# Patient Record
Sex: MALE | Race: BLACK OR AFRICAN AMERICAN | Employment: OTHER | URBAN - METROPOLITAN AREA
[De-identification: names, ages, dates, MRNs, and addresses within clinical notes are randomized per-mention and may not be internally consistent; named-entity substitution may affect disease eponyms.]

---

## 2018-06-25 PROBLEM — F32.A MILD DEPRESSIVE DISORDER: Status: ACTIVE | Noted: 2018-06-25

## 2018-06-25 PROCEDURE — 81003 URINALYSIS AUTO W/O SCOPE: CPT | Performed by: INTERNAL MEDICINE

## 2019-06-13 PROBLEM — F32.A MILD DEPRESSIVE DISORDER: Status: RESOLVED | Noted: 2018-06-25 | Resolved: 2019-06-13

## 2021-06-21 PROBLEM — M06.00 RHEUMATOID ARTHRITIS WITH NEGATIVE RHEUMATOID FACTOR (HCC): Chronic | Status: ACTIVE | Noted: 2021-06-21

## 2021-09-22 PROBLEM — M06.00 RHEUMATOID ARTHRITIS WITH NEGATIVE RHEUMATOID FACTOR (HCC): Chronic | Status: RESOLVED | Noted: 2021-06-21 | Resolved: 2021-09-22

## 2021-09-22 PROBLEM — M05.79 RHEUMATOID ARTHRITIS INVOLVING MULTIPLE SITES WITH POSITIVE RHEUMATOID FACTOR (HCC): Status: ACTIVE | Noted: 2021-09-22

## 2022-02-14 PROBLEM — Z79.899 IMMUNOCOMPROMISED STATE DUE TO DRUG THERAPY  (HCC): Status: ACTIVE | Noted: 2022-02-14

## 2022-02-14 PROBLEM — C67.2 MALIGNANT NEOPLASM OF LATERAL WALL OF URINARY BLADDER (HCC): Status: ACTIVE | Noted: 2022-02-14

## 2022-02-14 PROBLEM — D84.821 IMMUNOCOMPROMISED STATE DUE TO DRUG THERAPY (HCC): Status: ACTIVE | Noted: 2022-02-14

## 2022-02-14 PROBLEM — Z79.899 IMMUNOCOMPROMISED STATE DUE TO DRUG THERAPY (HCC): Status: ACTIVE | Noted: 2022-02-14

## 2022-02-14 PROBLEM — D84.821 IMMUNOCOMPROMISED STATE DUE TO DRUG THERAPY  (HCC): Status: ACTIVE | Noted: 2022-02-14

## 2022-02-14 PROBLEM — Z79.899 IMMUNOCOMPROMISED STATE DUE TO DRUG THERAPY: Status: ACTIVE | Noted: 2022-02-14

## 2022-02-14 PROBLEM — D84.821 IMMUNOCOMPROMISED STATE DUE TO DRUG THERAPY: Status: ACTIVE | Noted: 2022-02-14

## 2022-03-23 ENCOUNTER — OFFICE VISIT (OUTPATIENT)
Dept: RHEUMATOLOGY | Facility: CLINIC | Age: 67
End: 2022-03-23
Payer: COMMERCIAL

## 2022-03-23 ENCOUNTER — TELEPHONE (OUTPATIENT)
Dept: RHEUMATOLOGY | Facility: CLINIC | Age: 67
End: 2022-03-23

## 2022-03-23 ENCOUNTER — LAB ENCOUNTER (OUTPATIENT)
Dept: LAB | Age: 67
End: 2022-03-23
Attending: INTERNAL MEDICINE
Payer: MEDICARE

## 2022-03-23 ENCOUNTER — HOSPITAL ENCOUNTER (OUTPATIENT)
Dept: GENERAL RADIOLOGY | Age: 67
Discharge: HOME | End: 2022-03-23
Attending: INTERNAL MEDICINE
Payer: MEDICARE

## 2022-03-23 VITALS
HEART RATE: 69 BPM | HEIGHT: 74 IN | WEIGHT: 272 LBS | DIASTOLIC BLOOD PRESSURE: 92 MMHG | BODY MASS INDEX: 34.91 KG/M2 | SYSTOLIC BLOOD PRESSURE: 145 MMHG

## 2022-03-23 DIAGNOSIS — M05.79 RHEUMATOID ARTHRITIS INVOLVING MULTIPLE SITES WITH POSITIVE RHEUMATOID FACTOR (HCC): Primary | ICD-10-CM

## 2022-03-23 DIAGNOSIS — M05.79 RHEUMATOID ARTHRITIS INVOLVING MULTIPLE SITES WITH POSITIVE RHEUMATOID FACTOR (HCC): ICD-10-CM

## 2022-03-23 DIAGNOSIS — Z51.81 THERAPEUTIC DRUG MONITORING: ICD-10-CM

## 2022-03-23 LAB
ALBUMIN SERPL-MCNC: 3.8 G/DL (ref 3.4–5)
AST SERPL-CCNC: 14 U/L (ref 15–37)
BASOPHILS # BLD AUTO: 0.05 X10(3) UL (ref 0–0.2)
BASOPHILS NFR BLD AUTO: 0.7 %
CREAT BLD-MCNC: 0.96 MG/DL
CRP SERPL-MCNC: 2.02 MG/DL (ref ?–0.3)
DEPRECATED RDW RBC AUTO: 54.5 FL (ref 35.1–46.3)
EOSINOPHIL # BLD AUTO: 0.21 X10(3) UL (ref 0–0.7)
EOSINOPHIL NFR BLD AUTO: 2.7 %
ERYTHROCYTE [DISTWIDTH] IN BLOOD BY AUTOMATED COUNT: 15.9 % (ref 11–15)
ERYTHROCYTE [SEDIMENTATION RATE] IN BLOOD: 16 MM/HR
HCT VFR BLD AUTO: 43.9 %
HGB BLD-MCNC: 14.1 G/DL
IMM GRANULOCYTES # BLD AUTO: 0.01 X10(3) UL (ref 0–1)
IMM GRANULOCYTES NFR BLD: 0.1 %
LYMPHOCYTES # BLD AUTO: 2.33 X10(3) UL (ref 1–4)
LYMPHOCYTES NFR BLD AUTO: 30.5 %
MCH RBC QN AUTO: 29.9 PG (ref 26–34)
MCHC RBC AUTO-ENTMCNC: 32.1 G/DL (ref 31–37)
MCV RBC AUTO: 93 FL
MONOCYTES # BLD AUTO: 0.71 X10(3) UL (ref 0.1–1)
MONOCYTES NFR BLD AUTO: 9.3 %
NEUTROPHILS # BLD AUTO: 4.34 X10 (3) UL (ref 1.5–7.7)
NEUTROPHILS # BLD AUTO: 4.34 X10(3) UL (ref 1.5–7.7)
NEUTROPHILS NFR BLD AUTO: 56.7 %
PLATELET # BLD AUTO: 260 10(3)UL (ref 150–450)
RBC # BLD AUTO: 4.72 X10(6)UL
WBC # BLD AUTO: 7.7 X10(3) UL (ref 4–11)

## 2022-03-23 PROCEDURE — 85025 COMPLETE CBC W/AUTO DIFF WBC: CPT

## 2022-03-23 PROCEDURE — 73130 X-RAY EXAM OF HAND: CPT | Performed by: INTERNAL MEDICINE

## 2022-03-23 PROCEDURE — 86140 C-REACTIVE PROTEIN: CPT

## 2022-03-23 PROCEDURE — 73610 X-RAY EXAM OF ANKLE: CPT | Performed by: INTERNAL MEDICINE

## 2022-03-23 PROCEDURE — 3080F DIAST BP >= 90 MM HG: CPT | Performed by: INTERNAL MEDICINE

## 2022-03-23 PROCEDURE — 36415 COLL VENOUS BLD VENIPUNCTURE: CPT

## 2022-03-23 PROCEDURE — 82040 ASSAY OF SERUM ALBUMIN: CPT

## 2022-03-23 PROCEDURE — 84450 TRANSFERASE (AST) (SGOT): CPT

## 2022-03-23 PROCEDURE — 3008F BODY MASS INDEX DOCD: CPT | Performed by: INTERNAL MEDICINE

## 2022-03-23 PROCEDURE — 85652 RBC SED RATE AUTOMATED: CPT

## 2022-03-23 PROCEDURE — 99204 OFFICE O/P NEW MOD 45 MIN: CPT | Performed by: INTERNAL MEDICINE

## 2022-03-23 PROCEDURE — 86200 CCP ANTIBODY: CPT | Performed by: INTERNAL MEDICINE

## 2022-03-23 PROCEDURE — 3077F SYST BP >= 140 MM HG: CPT | Performed by: INTERNAL MEDICINE

## 2022-03-23 PROCEDURE — 73560 X-RAY EXAM OF KNEE 1 OR 2: CPT | Performed by: INTERNAL MEDICINE

## 2022-03-23 PROCEDURE — 82565 ASSAY OF CREATININE: CPT

## 2022-03-23 RX ORDER — PREDNISONE 1 MG/1
TABLET ORAL
Qty: 90 TABLET | Refills: 3 | Status: SHIPPED | OUTPATIENT
Start: 2022-03-23

## 2022-03-23 NOTE — PROGRESS NOTES
Dear Dr. Sergei Regalado:    I saw your patient Omar Cantu in consultation in my Rheumatology clinic this afternoon regarding his rheumatoid arthritis. As you know, he is a 25-year-old gentleman who a year ago developed severe swelling, pain, and stiffness across his wrists, hand MCP and PIP joints, knees, ankles, elbows, and shoulders. You started him on prednisone and referred him to rheumatology. June of 2021, rheumatoid factor was 184. Rheumatoid arthritis was diagnosed. He was on 5 mg of prednisone daily for awhile, which helped. He has been on methotrexate, most recently 20 mg weekly. He is not sure it is helping. He tolerates it. His last labs were December 8th of 2021. CBC and CMP were normal.  QuantiFERON-TB gold test negative. Hepatitis B and C negative. C-reactive protein 0.89, and sed rate 3. The prior authorization was approved for Rinvoq 15 mg daily, but he decided against, given the potential side effects. His arthritis is not doing well. He has sleep apnea, but has not had a functional CPAP machine. He is not refreshed on awakening, and his energy is low. When he gets out of bed in the morning, all of his joints are stiffer and more swollen. Warm water may help minimally. He is very limited. He cannot do heavy lifting. His walking is limited. Past medical history: In the past he had had bleeding hemorrhoids and constipation which she is doing okay with recently. He has had obesity. He has had kidney stones. He has hypertension. He has obstructive sleep apnea. He is smoking less than 1/2 pack of cigarettes daily. He has right bundle branch block. He had prostate and urethral cancer, and now bladder neoplasm. He is getting BCG treatments for 6 weeks. Biopsy was + January 2022. He is on folic acid, Hyzaar, methotrexate 20 mg weekly, vitamins. He is allergic to penicillin. Family history:  His mother has rheumatoid arthritis.     Social history:  He is  with 2 children. He is semiretired. He is now working as a . He smokes cigarettes. Minimal alcohol. He is gone to tea in the morning rather than coffee. Review of systems:  No fevers, chills, sweats, anorexia, weight loss, rash. Normal male pattern hair loss. No internal inflammation, oral or nasal ulcers, of adenopathy. No shortness of breath or chest pain. He has occasional indigestion if he eats hot peppers. No stomach pain, nausea or vomiting, constipation or diarrhea, blood in his stools. He has nocturia at least 1 time. Lately he has had some dysuria. He has dry eyes and will use artificial tears. No dry mouth. No Raynaud's or headache. Physical exam:  Pleasant gentleman in no acute distress. Blood pressure (!) 145/92, pulse 69, height 6' 2\" (1.88 m), weight 272 lb (123.4 kg). No rash. Normal male pattern hair loss. No conjunctival injection. No nasal oral lesions. No cervical adenopathy. Lungs clear. S1 and S2 regular. Abdomen without hepatosplenomegaly or tenderness. Normal bowel sounds. No lower extremity edema. Neck motion is mildly limited. Shoulder motion is limited and painful bilaterally. He cannot extend his right elbow fully and there is some soft tissue swelling. He has synovitis across both wrists and across the MCP joints of his hands.  strength is very weak. Hips move well. He has effusions of his knees with limited and painful flexion. There is marked swelling about his ankles. The balls of his feet are nontender to squeeze. Assessment and plan:    1. Severe RF positive rheumatoid arthritis, starting in the Spring of 2021. It is uncontrolled on methotrexate 25 mg weekly. He decided against Rinroq, out of concern of side effects. Recently, is has gotten a black box warning, concerning its increased risk of heart attack and stroke, clots, cancer, etc.    I would prefer he go with Enbrel 50 mg SureClick weekly. We discussed it.   He was given the up-to-date article on Enbrel. The prior authorization will be obtained. He will be called for a teaching when it has been approved. It should help quickly. The potential side effects were discussed. It has been used 22 years, and has not been shown to increase risk of solid tumors. He will go down to 20 mg of methotrexate weekly, and his prescription was refilled. He will start back 5 mg of prednisone every morning, which should help immediatly. X-rays will be done of his left hand, left knee, and left ankle. CCP antibody will be done. He tentatively will schedule back in 8 weeks, at which time he should have been on Enbrel for over a month. 2.  Bladder neoplasm, receiving BCG treatments for 6 weeks. He will discuss Enbrel with his oncologist, to make sure that it is okay. 3.  Obstructive sleep apnea, waiting for another CPAP machine. 4.  Continued cigarette smoking. Thank you for inviting me to participate in his care. Sincerely,      Karen King MD   Rheumatology.

## 2022-03-25 LAB — CCP IGG SERPL-ACNC: >340 U/ML (ref 0–6.9)

## 2022-03-25 RX ORDER — ETANERCEPT 50 MG/ML
50 SOLUTION SUBCUTANEOUS WEEKLY
Qty: 4 EACH | Refills: 5 | Status: SHIPPED | OUTPATIENT
Start: 2022-03-25 | End: 2022-04-24

## 2022-03-30 ENCOUNTER — NURSE ONLY (OUTPATIENT)
Dept: RHEUMATOLOGY | Facility: CLINIC | Age: 67
End: 2022-03-30
Payer: COMMERCIAL

## 2022-03-30 DIAGNOSIS — M05.79 RHEUMATOID ARTHRITIS INVOLVING MULTIPLE SITES WITH POSITIVE RHEUMATOID FACTOR (HCC): Primary | ICD-10-CM

## 2022-03-30 PROCEDURE — 99211 OFF/OP EST MAY X REQ PHY/QHP: CPT | Performed by: INTERNAL MEDICINE

## 2022-05-24 ENCOUNTER — LAB ENCOUNTER (OUTPATIENT)
Dept: LAB | Age: 67
End: 2022-05-24
Attending: INTERNAL MEDICINE
Payer: MEDICARE

## 2022-05-24 ENCOUNTER — OFFICE VISIT (OUTPATIENT)
Dept: RHEUMATOLOGY | Facility: CLINIC | Age: 67
End: 2022-05-24
Payer: COMMERCIAL

## 2022-05-24 VITALS
DIASTOLIC BLOOD PRESSURE: 93 MMHG | SYSTOLIC BLOOD PRESSURE: 137 MMHG | HEIGHT: 74 IN | HEART RATE: 69 BPM | WEIGHT: 273.38 LBS | BODY MASS INDEX: 35.08 KG/M2

## 2022-05-24 DIAGNOSIS — M05.79 RHEUMATOID ARTHRITIS INVOLVING MULTIPLE SITES WITH POSITIVE RHEUMATOID FACTOR (HCC): Primary | ICD-10-CM

## 2022-05-24 DIAGNOSIS — Z01.818 PRE-OP TESTING: ICD-10-CM

## 2022-05-24 DIAGNOSIS — Z51.81 THERAPEUTIC DRUG MONITORING: ICD-10-CM

## 2022-05-24 DIAGNOSIS — M05.79 RHEUMATOID ARTHRITIS INVOLVING MULTIPLE SITES WITH POSITIVE RHEUMATOID FACTOR (HCC): ICD-10-CM

## 2022-05-24 LAB
ALBUMIN SERPL-MCNC: 3.7 G/DL (ref 3.4–5)
ALBUMIN/GLOB SERPL: 1 {RATIO} (ref 1–2)
ALP LIVER SERPL-CCNC: 73 U/L
ALT SERPL-CCNC: 29 U/L
ANION GAP SERPL CALC-SCNC: 4 MMOL/L (ref 0–18)
AST SERPL-CCNC: 17 U/L (ref 15–37)
BASOPHILS # BLD AUTO: 0.04 X10(3) UL (ref 0–0.2)
BASOPHILS NFR BLD AUTO: 0.6 %
BILIRUB SERPL-MCNC: 0.5 MG/DL (ref 0.1–2)
BUN BLD-MCNC: 18 MG/DL (ref 7–18)
BUN/CREAT SERPL: 18.6 (ref 10–20)
CALCIUM BLD-MCNC: 8.7 MG/DL (ref 8.5–10.1)
CHLORIDE SERPL-SCNC: 109 MMOL/L (ref 98–112)
CO2 SERPL-SCNC: 28 MMOL/L (ref 21–32)
CREAT BLD-MCNC: 0.97 MG/DL
CRP SERPL-MCNC: 0.67 MG/DL (ref ?–0.3)
DEPRECATED RDW RBC AUTO: 61.1 FL (ref 35.1–46.3)
EOSINOPHIL # BLD AUTO: 0.05 X10(3) UL (ref 0–0.7)
EOSINOPHIL NFR BLD AUTO: 0.8 %
ERYTHROCYTE [DISTWIDTH] IN BLOOD BY AUTOMATED COUNT: 17.9 % (ref 11–15)
ERYTHROCYTE [SEDIMENTATION RATE] IN BLOOD: 10 MM/HR
FASTING STATUS PATIENT QL REPORTED: NO
GLOBULIN PLAS-MCNC: 3.8 G/DL (ref 2.8–4.4)
GLUCOSE BLD-MCNC: 92 MG/DL (ref 70–99)
HCT VFR BLD AUTO: 42.5 %
HGB BLD-MCNC: 14 G/DL
IMM GRANULOCYTES # BLD AUTO: 0.02 X10(3) UL (ref 0–1)
IMM GRANULOCYTES NFR BLD: 0.3 %
LYMPHOCYTES # BLD AUTO: 1.84 X10(3) UL (ref 1–4)
LYMPHOCYTES NFR BLD AUTO: 28.6 %
MCH RBC QN AUTO: 31 PG (ref 26–34)
MCHC RBC AUTO-ENTMCNC: 32.9 G/DL (ref 31–37)
MCV RBC AUTO: 94 FL
MONOCYTES # BLD AUTO: 0.54 X10(3) UL (ref 0.1–1)
MONOCYTES NFR BLD AUTO: 8.4 %
NEUTROPHILS # BLD AUTO: 3.95 X10 (3) UL (ref 1.5–7.7)
NEUTROPHILS # BLD AUTO: 3.95 X10(3) UL (ref 1.5–7.7)
NEUTROPHILS NFR BLD AUTO: 61.3 %
OSMOLALITY SERPL CALC.SUM OF ELEC: 294 MOSM/KG (ref 275–295)
PLATELET # BLD AUTO: 256 10(3)UL (ref 150–450)
POTASSIUM SERPL-SCNC: 4 MMOL/L (ref 3.5–5.1)
PROT SERPL-MCNC: 7.5 G/DL (ref 6.4–8.2)
RBC # BLD AUTO: 4.52 X10(6)UL
SODIUM SERPL-SCNC: 141 MMOL/L (ref 136–145)
WBC # BLD AUTO: 6.4 X10(3) UL (ref 4–11)

## 2022-05-24 PROCEDURE — 85652 RBC SED RATE AUTOMATED: CPT

## 2022-05-24 PROCEDURE — 36415 COLL VENOUS BLD VENIPUNCTURE: CPT

## 2022-05-24 PROCEDURE — 3080F DIAST BP >= 90 MM HG: CPT | Performed by: INTERNAL MEDICINE

## 2022-05-24 PROCEDURE — 87086 URINE CULTURE/COLONY COUNT: CPT

## 2022-05-24 PROCEDURE — 80053 COMPREHEN METABOLIC PANEL: CPT

## 2022-05-24 PROCEDURE — 3075F SYST BP GE 130 - 139MM HG: CPT | Performed by: INTERNAL MEDICINE

## 2022-05-24 PROCEDURE — 99214 OFFICE O/P EST MOD 30 MIN: CPT | Performed by: INTERNAL MEDICINE

## 2022-05-24 PROCEDURE — 3008F BODY MASS INDEX DOCD: CPT | Performed by: INTERNAL MEDICINE

## 2022-05-24 PROCEDURE — 85025 COMPLETE CBC W/AUTO DIFF WBC: CPT

## 2022-05-24 PROCEDURE — 86140 C-REACTIVE PROTEIN: CPT

## 2022-05-24 RX ORDER — ETANERCEPT 50 MG/ML
50 SOLUTION SUBCUTANEOUS WEEKLY
COMMUNITY
Start: 2022-05-11

## 2022-05-24 RX ORDER — PREDNISONE 1 MG/1
TABLET ORAL
Qty: 90 TABLET | Refills: 3 | COMMUNITY
Start: 2022-05-24

## 2022-07-12 RX ORDER — METHOTREXATE 2.5 MG/1
TABLET ORAL
Qty: 120 TABLET | Refills: 0 | Status: SHIPPED | OUTPATIENT
Start: 2022-07-12

## 2022-08-15 RX ORDER — ETANERCEPT 50 MG/ML
SOLUTION SUBCUTANEOUS
Qty: 4 ML | Refills: 5 | Status: SHIPPED | OUTPATIENT
Start: 2022-08-15

## 2022-08-20 NOTE — PROGRESS NOTES
Kia Holland is a 77-year-old gentleman with rheumatoid factor positive rheumatoid arthritis, that started in the spring 2021. CCP antibody is greater than 340. It was uncontrolled early 2022 on methotrexate 25 mg weekly alone, so he has been injecting Enbrel 50 mg SureClick once a week since 3/23/2022. He dropped his methotrexate to 20 mg weekly. His arthritis remains much improved. Maybe 3 out of 7 days each week, he will take 5 mg of prednisone. He can feel swelling especially across his hand MCP joints. It takes several minutes to loosen up in the morning. He has some distal lower extremity edema. He is down, because his mother has just been placed into hospice in [place]. He has an MRI scheduled because of his history of bladder neoplasm. He has not had injection site reactions. No infections. He finished his treatments for bladder neoplasm. His last labs were March 23rd, 2022, CBC, creatinine, AST, albumin, and sed rate of 16 were normal.  C-reactive protein was 2.02.       Review of Systems:   Constitutional: Negative for fever. Respiratory: Negative for shortness of breath. Cardiovascular: Negative for chest pain. Gastrointestinal: Negative for abdominal pain. Skin: Negative for rash. Hematological: Negative for adenopathy. Allergies:  Pcn [Penicillins]       SWELLING    Comment:Throat swells up     Physical Exam:  Pleasant gentleman in no acute distress. Blood pressure 135/73, pulse 84, weight 274 lb (124.3 kg). HENT:      Head: Normocephalic. Eyes:      Pupils: Pupils are equal, round, and reactive to light. Cardiovascular:      Rate and Rhythm: Normal rate and regular rhythm. Pulses: Normal pulses. Heart sounds: Normal heart sounds. Pulmonary:      Effort: Pulmonary effort is normal.      Breath sounds: Normal breath sounds. Abdominal:      General: Bowel sounds are normal.      Tenderness: There is no abdominal tenderness.    Musculoskeletal: Comments:  strength is good bilaterally. Possible synovitis across his wrists and the MCP joints of his hands.  strength is good   Skin:     Findings: No rash. Neurological:      Mental Status: He is oriented to person, place, and time. Blood pressure (!) 137/93, pulse 69, height 6' 2\" (1.88 m), weight 273 lb 6.4 oz (124 kg). Assessment & Plan:     1. Severe RF and CCP positive rheumatoid arthritis, starting in the Spring of 2021. It was uncontrolled on methotrexate 25 mg weekly alone. It remains much improved, since Enbrel 50 mg once weekly was added. He is on 20 mg of methotrexate weekly. Prednisone 5 mg daily as needed is okay. He will go for monitoring labs today. He will schedule back in 3 months. His handicap parking permit application was filled out. 2.  Bladder neoplasm, receiving BCG treatments for 6 weeks. Enbrel is OK with his oncologist.  MRI pending. 3.  Obstructive sleep apnea, waiting for another CPAP machine. 4.  Continued cigarette smoking.

## 2022-08-23 ENCOUNTER — LAB ENCOUNTER (OUTPATIENT)
Dept: LAB | Age: 67
End: 2022-08-23
Attending: INTERNAL MEDICINE
Payer: MEDICARE

## 2022-08-23 ENCOUNTER — OFFICE VISIT (OUTPATIENT)
Dept: RHEUMATOLOGY | Facility: CLINIC | Age: 67
End: 2022-08-23
Payer: COMMERCIAL

## 2022-08-23 VITALS
BODY MASS INDEX: 35 KG/M2 | HEART RATE: 84 BPM | DIASTOLIC BLOOD PRESSURE: 73 MMHG | WEIGHT: 274 LBS | SYSTOLIC BLOOD PRESSURE: 135 MMHG

## 2022-08-23 DIAGNOSIS — M05.79 RHEUMATOID ARTHRITIS INVOLVING MULTIPLE SITES WITH POSITIVE RHEUMATOID FACTOR (HCC): ICD-10-CM

## 2022-08-23 DIAGNOSIS — Z51.81 THERAPEUTIC DRUG MONITORING: ICD-10-CM

## 2022-08-23 DIAGNOSIS — M05.79 RHEUMATOID ARTHRITIS INVOLVING MULTIPLE SITES WITH POSITIVE RHEUMATOID FACTOR (HCC): Primary | ICD-10-CM

## 2022-08-23 LAB
ALBUMIN SERPL-MCNC: 3.7 G/DL (ref 3.4–5)
AST SERPL-CCNC: 14 U/L (ref 15–37)
BASOPHILS # BLD AUTO: 0.04 X10(3) UL (ref 0–0.2)
BASOPHILS NFR BLD AUTO: 0.5 %
CREAT BLD-MCNC: 1.14 MG/DL
CRP SERPL-MCNC: 1.23 MG/DL (ref ?–0.3)
DEPRECATED RDW RBC AUTO: 55.1 FL (ref 35.1–46.3)
EOSINOPHIL # BLD AUTO: 0.15 X10(3) UL (ref 0–0.7)
EOSINOPHIL NFR BLD AUTO: 1.9 %
ERYTHROCYTE [DISTWIDTH] IN BLOOD BY AUTOMATED COUNT: 15.9 % (ref 11–15)
ERYTHROCYTE [SEDIMENTATION RATE] IN BLOOD: 14 MM/HR
GFR SERPLBLD BASED ON 1.73 SQ M-ARVRAT: 70 ML/MIN/1.73M2 (ref 60–?)
HCT VFR BLD AUTO: 42.5 %
HGB BLD-MCNC: 14 G/DL
IMM GRANULOCYTES # BLD AUTO: 0.03 X10(3) UL (ref 0–1)
IMM GRANULOCYTES NFR BLD: 0.4 %
LYMPHOCYTES # BLD AUTO: 2.41 X10(3) UL (ref 1–4)
LYMPHOCYTES NFR BLD AUTO: 30.5 %
MCH RBC QN AUTO: 31 PG (ref 26–34)
MCHC RBC AUTO-ENTMCNC: 32.9 G/DL (ref 31–37)
MCV RBC AUTO: 94.2 FL
MONOCYTES # BLD AUTO: 0.75 X10(3) UL (ref 0.1–1)
MONOCYTES NFR BLD AUTO: 9.5 %
NEUTROPHILS # BLD AUTO: 4.53 X10 (3) UL (ref 1.5–7.7)
NEUTROPHILS # BLD AUTO: 4.53 X10(3) UL (ref 1.5–7.7)
NEUTROPHILS NFR BLD AUTO: 57.2 %
PLATELET # BLD AUTO: 230 10(3)UL (ref 150–450)
RBC # BLD AUTO: 4.51 X10(6)UL
WBC # BLD AUTO: 7.9 X10(3) UL (ref 4–11)

## 2022-08-23 PROCEDURE — 99214 OFFICE O/P EST MOD 30 MIN: CPT | Performed by: INTERNAL MEDICINE

## 2022-08-23 PROCEDURE — 1125F AMNT PAIN NOTED PAIN PRSNT: CPT | Performed by: INTERNAL MEDICINE

## 2022-08-23 PROCEDURE — 85025 COMPLETE CBC W/AUTO DIFF WBC: CPT

## 2022-08-23 PROCEDURE — 3078F DIAST BP <80 MM HG: CPT | Performed by: INTERNAL MEDICINE

## 2022-08-23 PROCEDURE — 86140 C-REACTIVE PROTEIN: CPT

## 2022-08-23 PROCEDURE — 3075F SYST BP GE 130 - 139MM HG: CPT | Performed by: INTERNAL MEDICINE

## 2022-08-23 PROCEDURE — 82040 ASSAY OF SERUM ALBUMIN: CPT

## 2022-08-23 PROCEDURE — 82565 ASSAY OF CREATININE: CPT

## 2022-08-23 PROCEDURE — 36415 COLL VENOUS BLD VENIPUNCTURE: CPT

## 2022-08-23 PROCEDURE — 84450 TRANSFERASE (AST) (SGOT): CPT

## 2022-08-23 PROCEDURE — 85652 RBC SED RATE AUTOMATED: CPT

## 2022-11-25 NOTE — PROGRESS NOTES
Leo Francis is a 68-year-old gentleman with rheumatoid factor positive rheumatoid arthritis, that started in the spring 2021. CCP antibody is greater than 340. It was uncontrolled early 2022 on methotrexate 25 mg weekly alone, so he has been injecting Enbrel 50 mg SureClick once a week since 3/23/2022. He dropped his methotrexate to 20 mg weekly. His arthritis remains much improved. Maybe 3 out of 7 days each week, he will take 5 mg of prednisone. He can feel swelling, especially across his hand MCP joints. It takes several minutes to loosen up in the morning. He has some distal lower extremity edema. He is down, because his mother passed away several weeks ago. He has not had injection site reactions. No infections. He finished his treatments for bladder neoplasm. His last labs were August 23rd of 2022, CBC, creatinine, AST, albumin, and sed rate of 14 were normal.  C-reactive protein was 1.23. Review of Systems:   Constitutional: Negative for fever. Respiratory: Negative for shortness of breath. Cardiovascular: Negative for chest pain. Gastrointestinal: Negative for abdominal pain. Skin: Negative for rash. Hematological: Negative for adenopathy. Allergies:  Pcn [Penicillins]       SWELLING    Comment:Throat swells up     Physical Exam:  Pleasant gentleman in no acute distress. Blood pressure 149/90, pulse 78, height 6' 2\" (1.88 m), weight 278 lb (126.1 kg). HENT:      Head: Normocephalic. Eyes:      Pupils: Pupils are equal, round, and reactive to light. Cardiovascular:      Rate and Rhythm: Normal rate and regular rhythm. Pulses: Normal pulses. Heart sounds: Normal heart sounds. Pulmonary:      Effort: Pulmonary effort is normal.      Breath sounds: Normal breath sounds. Abdominal:      General: Bowel sounds are normal.      Tenderness: There is no abdominal tenderness. Musculoskeletal:      Comments:  strength is good bilaterally.   Possible synovitis across his wrists and the MCP joints of his hands.  strength is good   Skin:     Findings: No rash. Neurological:      Mental Status: He is oriented to person, place, and time. Assessment & Plan:     1. Severe RF and CCP positive rheumatoid arthritis, starting in the Spring of 2021. It was uncontrolled on methotrexate 25 mg weekly alone. It remains much improved, since Enbrel 50 mg once weekly was added. He is on 20 mg of methotrexate weekly. Prednisone 5 mg daily as needed is okay. He will go for monitoring labs today. He will schedule back in 3 months. He does not want to add another medication. 2.  Bladder neoplasm, receiving BCG treatments for 6 weeks. Enbrel is OK with his oncologist.    3.  Obstructive sleep apnea, waiting for another CPAP machine. 4.  Continued cigarette smoking.

## 2022-11-29 ENCOUNTER — LAB ENCOUNTER (OUTPATIENT)
Dept: LAB | Age: 67
End: 2022-11-29
Attending: INTERNAL MEDICINE
Payer: MEDICARE

## 2022-11-29 ENCOUNTER — OFFICE VISIT (OUTPATIENT)
Dept: RHEUMATOLOGY | Facility: CLINIC | Age: 67
End: 2022-11-29
Payer: COMMERCIAL

## 2022-11-29 VITALS
BODY MASS INDEX: 35.68 KG/M2 | DIASTOLIC BLOOD PRESSURE: 90 MMHG | WEIGHT: 278 LBS | HEART RATE: 78 BPM | HEIGHT: 74 IN | SYSTOLIC BLOOD PRESSURE: 149 MMHG

## 2022-11-29 DIAGNOSIS — M05.79 RHEUMATOID ARTHRITIS INVOLVING MULTIPLE SITES WITH POSITIVE RHEUMATOID FACTOR (HCC): ICD-10-CM

## 2022-11-29 DIAGNOSIS — Z51.81 THERAPEUTIC DRUG MONITORING: ICD-10-CM

## 2022-11-29 DIAGNOSIS — M05.79 RHEUMATOID ARTHRITIS INVOLVING MULTIPLE SITES WITH POSITIVE RHEUMATOID FACTOR (HCC): Primary | ICD-10-CM

## 2022-11-29 LAB
ALBUMIN SERPL-MCNC: 3.7 G/DL (ref 3.4–5)
AST SERPL-CCNC: 13 U/L (ref 15–37)
BASOPHILS # BLD AUTO: 0.02 X10(3) UL (ref 0–0.2)
BASOPHILS NFR BLD AUTO: 0.3 %
CREAT BLD-MCNC: 1.12 MG/DL
CRP SERPL-MCNC: 0.49 MG/DL (ref ?–0.3)
DEPRECATED RDW RBC AUTO: 54.7 FL (ref 35.1–46.3)
EOSINOPHIL # BLD AUTO: 0.16 X10(3) UL (ref 0–0.7)
EOSINOPHIL NFR BLD AUTO: 2 %
ERYTHROCYTE [DISTWIDTH] IN BLOOD BY AUTOMATED COUNT: 16 % (ref 11–15)
ERYTHROCYTE [SEDIMENTATION RATE] IN BLOOD: 25 MM/HR
GFR SERPLBLD BASED ON 1.73 SQ M-ARVRAT: 71 ML/MIN/1.73M2 (ref 60–?)
HCT VFR BLD AUTO: 44.2 %
HGB BLD-MCNC: 14.4 G/DL
IMM GRANULOCYTES # BLD AUTO: 0.02 X10(3) UL (ref 0–1)
IMM GRANULOCYTES NFR BLD: 0.3 %
LYMPHOCYTES # BLD AUTO: 3.05 X10(3) UL (ref 1–4)
LYMPHOCYTES NFR BLD AUTO: 39.1 %
MCH RBC QN AUTO: 30.4 PG (ref 26–34)
MCHC RBC AUTO-ENTMCNC: 32.6 G/DL (ref 31–37)
MCV RBC AUTO: 93.4 FL
MONOCYTES # BLD AUTO: 0.78 X10(3) UL (ref 0.1–1)
MONOCYTES NFR BLD AUTO: 10 %
NEUTROPHILS # BLD AUTO: 3.78 X10 (3) UL (ref 1.5–7.7)
NEUTROPHILS # BLD AUTO: 3.78 X10(3) UL (ref 1.5–7.7)
NEUTROPHILS NFR BLD AUTO: 48.3 %
PLATELET # BLD AUTO: 235 10(3)UL (ref 150–450)
RBC # BLD AUTO: 4.73 X10(6)UL
WBC # BLD AUTO: 7.8 X10(3) UL (ref 4–11)

## 2022-11-29 PROCEDURE — 3080F DIAST BP >= 90 MM HG: CPT | Performed by: INTERNAL MEDICINE

## 2022-11-29 PROCEDURE — 85652 RBC SED RATE AUTOMATED: CPT

## 2022-11-29 PROCEDURE — 82565 ASSAY OF CREATININE: CPT

## 2022-11-29 PROCEDURE — 36415 COLL VENOUS BLD VENIPUNCTURE: CPT

## 2022-11-29 PROCEDURE — 1125F AMNT PAIN NOTED PAIN PRSNT: CPT | Performed by: INTERNAL MEDICINE

## 2022-11-29 PROCEDURE — 3008F BODY MASS INDEX DOCD: CPT | Performed by: INTERNAL MEDICINE

## 2022-11-29 PROCEDURE — 86140 C-REACTIVE PROTEIN: CPT

## 2022-11-29 PROCEDURE — 85025 COMPLETE CBC W/AUTO DIFF WBC: CPT

## 2022-11-29 PROCEDURE — 3077F SYST BP >= 140 MM HG: CPT | Performed by: INTERNAL MEDICINE

## 2022-11-29 PROCEDURE — 99214 OFFICE O/P EST MOD 30 MIN: CPT | Performed by: INTERNAL MEDICINE

## 2022-11-29 PROCEDURE — 82040 ASSAY OF SERUM ALBUMIN: CPT

## 2022-11-29 PROCEDURE — 84450 TRANSFERASE (AST) (SGOT): CPT

## 2022-11-29 RX ORDER — LOSARTAN POTASSIUM 100 MG/1
100 TABLET ORAL DAILY
COMMUNITY
Start: 2022-10-03

## 2022-11-29 RX ORDER — AMLODIPINE BESYLATE 5 MG/1
5 TABLET ORAL DAILY
COMMUNITY
Start: 2022-10-03

## 2022-11-29 RX ORDER — FOLIC ACID 1 MG/1
1 TABLET ORAL DAILY
Qty: 90 TABLET | Refills: 3 | Status: SHIPPED | OUTPATIENT
Start: 2022-11-29

## 2022-11-29 RX ORDER — METHOTREXATE 2.5 MG/1
TABLET ORAL
Qty: 104 TABLET | Refills: 1 | Status: SHIPPED | OUTPATIENT
Start: 2022-11-29

## 2023-01-10 NOTE — PROGRESS NOTES
Ismael Del Rio is a 58-year-old gentleman with rheumatoid factor positive rheumatoid arthritis, that started in the spring 2021. CCP antibody is greater than 340. It was uncontrolled early 2022 on methotrexate 25 mg weekly alone, so he has been injecting Enbrel 50 mg SureClick once a week, since 3/23/2022. He dropped his methotrexate to 20 mg weekly. His arthritis remains much improved, but he continues to have swelling in the AM, especially across his hand MCP joints. It can take several hours to loosen. Maybe 3 out of 7 days each week, he will take 5 mg of prednisone. He continues to be depressed, because his mother passed away months ago. He has not had injection site reactions. No infections. He finished his treatments for bladder neoplasm. Labs were repeated 11/29/2022. CBC, creatinine, AST, and albumin were normal. Sed rate was elevated at 25, and C-reactive protein at 0.49. Review of Systems:   Constitutional: Negative for fever. Respiratory: Negative for shortness of breath. Cardiovascular: Negative for chest pain. Gastrointestinal: Negative for abdominal pain. Skin: Negative for rash. Hematological: Negative for adenopathy. Allergies:  Pcn [Penicillins]       SWELLING    Comment:Throat swells up     Physical Exam:  Pleasant gentleman in no acute distress. Blood pressure (!) 159/99, pulse 76, height 6' 2\" (1.88 m), weight 287 lb (130.2 kg), SpO2 95 %. HENT:      Head: Normocephalic. Eyes:      Pupils: Pupils are equal, round, and reactive to light. Cardiovascular:      Rate and Rhythm: Normal rate and regular rhythm. Pulses: Normal pulses. Heart sounds: Normal heart sounds. Pulmonary:      Effort: Pulmonary effort is normal.      Breath sounds: Normal breath sounds. Abdominal:      General: Bowel sounds are normal.      Tenderness: There is no abdominal tenderness. Musculoskeletal:      Comments:  strength is good bilaterally.   Positive synovitis across his wrists and the MCP joints of his hands.  strength is weak. Skin:     Findings: No rash. Neurological:      Mental Status: He is oriented to person, place, and time. Assessment & Plan:     1. Severe RF and CCP positive rheumatoid arthritis, starting in the Spring of 2022. Active on Enbrel 50 mg once weekly, and 20 mg of methotrexate weekly. Prednisone 5 mg daily as needed is okay. We will add leflunomide 10 mg daily. The possible side effects were discussed, and he was given the up-to-date article on leflunomide. He will return in 6 weeks, after repeat blood monitoring. 2.  Bladder neoplasm, receiving BCG treatments for 6 weeks. Enbrel is OK with his oncologist.  Reevaluation soon. 3.  Obstructive sleep apnea, waiting for another CPAP machine. He still does not have his machine. 4.  Continued cigarette smoking. Down to minimum nicotine vape pen.

## 2023-01-11 ENCOUNTER — OFFICE VISIT (OUTPATIENT)
Dept: RHEUMATOLOGY | Facility: CLINIC | Age: 68
End: 2023-01-11
Payer: COMMERCIAL

## 2023-01-11 VITALS
HEIGHT: 74 IN | BODY MASS INDEX: 36.83 KG/M2 | WEIGHT: 287 LBS | SYSTOLIC BLOOD PRESSURE: 159 MMHG | HEART RATE: 76 BPM | DIASTOLIC BLOOD PRESSURE: 99 MMHG | OXYGEN SATURATION: 95 %

## 2023-01-11 DIAGNOSIS — M05.79 RHEUMATOID ARTHRITIS INVOLVING MULTIPLE SITES WITH POSITIVE RHEUMATOID FACTOR (HCC): Primary | ICD-10-CM

## 2023-01-11 DIAGNOSIS — Z51.81 THERAPEUTIC DRUG MONITORING: ICD-10-CM

## 2023-01-11 PROCEDURE — 3077F SYST BP >= 140 MM HG: CPT | Performed by: INTERNAL MEDICINE

## 2023-01-11 PROCEDURE — 99214 OFFICE O/P EST MOD 30 MIN: CPT | Performed by: INTERNAL MEDICINE

## 2023-01-11 PROCEDURE — 1125F AMNT PAIN NOTED PAIN PRSNT: CPT | Performed by: INTERNAL MEDICINE

## 2023-01-11 PROCEDURE — 3008F BODY MASS INDEX DOCD: CPT | Performed by: INTERNAL MEDICINE

## 2023-01-11 PROCEDURE — 3080F DIAST BP >= 90 MM HG: CPT | Performed by: INTERNAL MEDICINE

## 2023-01-11 RX ORDER — LEFLUNOMIDE 10 MG/1
TABLET ORAL
Qty: 30 TABLET | Refills: 2 | Status: SHIPPED | OUTPATIENT
Start: 2023-01-11

## 2023-01-25 NOTE — PROGRESS NOTES
Adriano Krishnamurthy is a 68-year-old gentleman with rheumatoid factor positive rheumatoid arthritis, that started in the spring 2021. CCP antibody is greater than 340. It was uncontrolled early 2022 on methotrexate 25 mg weekly alone, so he has been injecting Enbrel 50 mg SureClick once a week, since 3/23/2022. He dropped his methotrexate to 20 mg weekly. His arthritis remains much improved, but he continues to have swelling in the AM, especially across his hand MCP joints. It can take several hours to loosen. Maybe 3 out of 7 days each week, he will take 5 mg of prednisone. At his last visit 1/11/2023, he started leflunomide 10 mg daily. He is tolerating it well. He came in today because he has new insurance. He will need another prior authorization for Enbrel. His next dose was scheduled in 3 days. He has not had injection site reactions. No infections. He finished his treatments for bladder neoplasm. Labs were last done 11/29/2022. CBC, creatinine, AST, and albumin were normal. Sed rate was elevated at 25, and C-reactive protein at 0.49. Review of Systems:   Constitutional: Negative for fever. Respiratory: Negative for shortness of breath. Cardiovascular: Negative for chest pain. Gastrointestinal: Negative for abdominal pain. Skin: Negative for rash. Hematological: Negative for adenopathy. Allergies:  Pcn [Penicillins]       SWELLING    Comment:Throat swells up     Physical Exam:  Pleasant gentleman in no acute distress. Blood pressure (!) 160/103, pulse 80, height 6' 2\" (1.88 m), weight 284 lb (128.8 kg). HENT:      Head: Normocephalic. Eyes:      Pupils: Pupils are equal, round, and reactive to light. Cardiovascular:      Rate and Rhythm: Normal rate and regular rhythm. Pulses: Normal pulses. Heart sounds: Normal heart sounds. Pulmonary:      Effort: Pulmonary effort is normal.      Breath sounds: Normal breath sounds.    Abdominal:      General: Bowel sounds are normal.      Tenderness: There is no abdominal tenderness. Musculoskeletal:      Comments:  strength is good bilaterally. Active synovitis across his wrists and the MCP joints of his hands.  strength is weak. Skin:     Findings: No rash. Neurological:      Mental Status: He is oriented to person, place, and time. Assessment & Plan:     1. Severe RF and CCP positive rheumatoid arthritis, starting in the Spring of 2022. Active on Enbrel 50 mg once weekly, and 20 mg of methotrexate weekly. Prednisone 5 mg daily as needed is okay. Tolerating leflunomide 10 mg daily. He will go for monitoring and inflammation labs today. He will return in 2 months. Will get new prior Authorization for Enbrel, with his new insurance. 2.  Bladder neoplasm, receiving BCG treatments for 6 weeks. Enbrel is OK with his oncologist.  Reevaluation soon. 3.  Obstructive sleep apnea, waiting for another CPAP machine. He still does not have his machine. 4.  Continued cigarette smoking. Down to minimum nicotine vape pen.

## 2023-01-31 ENCOUNTER — OFFICE VISIT (OUTPATIENT)
Dept: RHEUMATOLOGY | Facility: CLINIC | Age: 68
End: 2023-01-31

## 2023-01-31 ENCOUNTER — TELEPHONE (OUTPATIENT)
Dept: RHEUMATOLOGY | Facility: CLINIC | Age: 68
End: 2023-01-31

## 2023-01-31 ENCOUNTER — LAB ENCOUNTER (OUTPATIENT)
Dept: LAB | Age: 68
End: 2023-01-31
Attending: INTERNAL MEDICINE
Payer: MEDICARE

## 2023-01-31 VITALS
DIASTOLIC BLOOD PRESSURE: 103 MMHG | WEIGHT: 284 LBS | BODY MASS INDEX: 36.45 KG/M2 | SYSTOLIC BLOOD PRESSURE: 160 MMHG | HEIGHT: 74 IN | HEART RATE: 80 BPM

## 2023-01-31 DIAGNOSIS — M05.79 RHEUMATOID ARTHRITIS INVOLVING MULTIPLE SITES WITH POSITIVE RHEUMATOID FACTOR (HCC): Primary | ICD-10-CM

## 2023-01-31 DIAGNOSIS — M05.79 RHEUMATOID ARTHRITIS INVOLVING MULTIPLE SITES WITH POSITIVE RHEUMATOID FACTOR (HCC): ICD-10-CM

## 2023-01-31 DIAGNOSIS — Z51.81 THERAPEUTIC DRUG MONITORING: ICD-10-CM

## 2023-01-31 LAB
ALBUMIN SERPL-MCNC: 3.7 G/DL (ref 3.4–5)
AST SERPL-CCNC: 15 U/L (ref 15–37)
BASOPHILS # BLD AUTO: 0.04 X10(3) UL (ref 0–0.2)
BASOPHILS NFR BLD AUTO: 0.7 %
CREAT BLD-MCNC: 1.05 MG/DL
CRP SERPL-MCNC: 0.46 MG/DL (ref ?–0.3)
DEPRECATED RDW RBC AUTO: 51.4 FL (ref 35.1–46.3)
EOSINOPHIL # BLD AUTO: 0.09 X10(3) UL (ref 0–0.7)
EOSINOPHIL NFR BLD AUTO: 1.6 %
ERYTHROCYTE [DISTWIDTH] IN BLOOD BY AUTOMATED COUNT: 15.4 % (ref 11–15)
ERYTHROCYTE [SEDIMENTATION RATE] IN BLOOD: 24 MM/HR
GFR SERPLBLD BASED ON 1.73 SQ M-ARVRAT: 77 ML/MIN/1.73M2 (ref 60–?)
HCT VFR BLD AUTO: 42.1 %
HGB BLD-MCNC: 13.8 G/DL
IMM GRANULOCYTES # BLD AUTO: 0.01 X10(3) UL (ref 0–1)
IMM GRANULOCYTES NFR BLD: 0.2 %
LYMPHOCYTES # BLD AUTO: 2.2 X10(3) UL (ref 1–4)
LYMPHOCYTES NFR BLD AUTO: 38.1 %
MCH RBC QN AUTO: 29.9 PG (ref 26–34)
MCHC RBC AUTO-ENTMCNC: 32.8 G/DL (ref 31–37)
MCV RBC AUTO: 91.3 FL
MONOCYTES # BLD AUTO: 0.47 X10(3) UL (ref 0.1–1)
MONOCYTES NFR BLD AUTO: 8.1 %
NEUTROPHILS # BLD AUTO: 2.97 X10 (3) UL (ref 1.5–7.7)
NEUTROPHILS # BLD AUTO: 2.97 X10(3) UL (ref 1.5–7.7)
NEUTROPHILS NFR BLD AUTO: 51.3 %
PLATELET # BLD AUTO: 241 10(3)UL (ref 150–450)
RBC # BLD AUTO: 4.61 X10(6)UL
WBC # BLD AUTO: 5.8 X10(3) UL (ref 4–11)

## 2023-01-31 PROCEDURE — 3008F BODY MASS INDEX DOCD: CPT | Performed by: INTERNAL MEDICINE

## 2023-01-31 PROCEDURE — 84450 TRANSFERASE (AST) (SGOT): CPT

## 2023-01-31 PROCEDURE — 3080F DIAST BP >= 90 MM HG: CPT | Performed by: INTERNAL MEDICINE

## 2023-01-31 PROCEDURE — 36415 COLL VENOUS BLD VENIPUNCTURE: CPT

## 2023-01-31 PROCEDURE — 82565 ASSAY OF CREATININE: CPT

## 2023-01-31 PROCEDURE — 82040 ASSAY OF SERUM ALBUMIN: CPT

## 2023-01-31 PROCEDURE — 85025 COMPLETE CBC W/AUTO DIFF WBC: CPT

## 2023-01-31 PROCEDURE — 3077F SYST BP >= 140 MM HG: CPT | Performed by: INTERNAL MEDICINE

## 2023-01-31 PROCEDURE — 86140 C-REACTIVE PROTEIN: CPT

## 2023-01-31 PROCEDURE — 85652 RBC SED RATE AUTOMATED: CPT

## 2023-01-31 PROCEDURE — 99213 OFFICE O/P EST LOW 20 MIN: CPT | Performed by: INTERNAL MEDICINE

## 2023-01-31 NOTE — TELEPHONE ENCOUNTER
Patient informed me of his new Insurance during his office visit today. PA needed for Enbrel, patient states he is due for an injection in 3 days. He is aware we will have to submit a PA through his new Insurance.

## 2023-02-01 RX ORDER — MEDROXYPROGESTERONE ACETATE 150 MG/ML
50 INJECTION, SUSPENSION INTRAMUSCULAR
Qty: 4 ML | Refills: 5 | Status: SHIPPED | OUTPATIENT
Start: 2023-02-01 | End: 2023-02-03

## 2023-02-01 NOTE — TELEPHONE ENCOUNTER
Per plan no PA required: The patient currently has access to the requested medication and a Prior Authorization is not needed for the patient/medication. Script pended to pharmacy.

## 2023-02-03 ENCOUNTER — TELEPHONE (OUTPATIENT)
Dept: RHEUMATOLOGY | Facility: CLINIC | Age: 68
End: 2023-02-03

## 2023-02-03 RX ORDER — MEDROXYPROGESTERONE ACETATE 150 MG/ML
50 INJECTION, SUSPENSION INTRAMUSCULAR
Qty: 4 ML | Refills: 5 | Status: SHIPPED | OUTPATIENT
Start: 2023-02-03

## 2023-02-03 NOTE — TELEPHONE ENCOUNTER
Spoke with patient and script resent to [facility] as requested. He believes Enbrel will be covered at [facility]. He will call office back if he is having any issues filling his medication.

## 2023-02-03 NOTE — TELEPHONE ENCOUNTER
Patient is requesting to have his Enbrel prescription transferred to [facility]. Please advise.  Patient is out of medication     [address], [address] [facility]

## 2023-02-24 ENCOUNTER — TELEPHONE (OUTPATIENT)
Dept: RHEUMATOLOGY | Facility: CLINIC | Age: 68
End: 2023-02-24

## 2023-02-24 NOTE — TELEPHONE ENCOUNTER
Dave Campbell, would like to inform Dr. Juan Knapp that the patient will no longer be getting the Enbrel medication from them. Dave Campbell, stated that the patient will be getting it from [facility].

## 2023-04-22 NOTE — PROGRESS NOTES
Jj Díaz is a 58-year-old gentleman with RF positive rheumatoid arthritis, that started in the spring 2021. CCP antibody is greater than 340. It was uncontrolled early 2022 on methotrexate 25 mg weekly alone, so he has been injecting Enbrel 50 mg SureClick once a week, since 3/23/2022. He dropped his methotrexate to 20 mg weekly. His arthritis continues to improve, since January of 2023, when he started leflunomide 10 mg daily. He is concerned, because there has been peeling of his skin, especially over his flexor wrists. There is not a rash. He is using a cream.    He has less swelling of his wrists and hands in the morning. He has been exercising for 6 weeks, and has lost 10 pounds. He feels better. He has not had injection site reactions. No infections. He finished his treatments for bladder neoplasm. Labs were last done 1/31/2023. CBC, creatinine, AST, and albumin normal.  Sed rate was elevated at 24, and C-reactive protein is 0.46. Review of Systems:   Constitutional: Negative for fever. Respiratory: Negative for shortness of breath. Cardiovascular: Negative for chest pain. Gastrointestinal: Negative for abdominal pain. Skin: Negative for rash. Hematological: Negative for adenopathy. Allergies:  Pcn [Penicillins]       SWELLING    Comment:Throat swells up     Physical Exam:  Pleasant gentleman in no acute distress Blood pressure 128/78, pulse 73, height 6' 2\" (1.88 m), weight 277 lb (125.6 kg). HENT:      Head: Normocephalic. Eyes:      Pupils: Pupils are equal, round, and reactive to light. Cardiovascular:      Rate and Rhythm: Normal rate and regular rhythm. Pulses: Normal pulses. Heart sounds: Normal heart sounds. Pulmonary:      Effort: Pulmonary effort is normal.      Breath sounds: Normal breath sounds. Abdominal:      General: Bowel sounds are normal.      Tenderness: There is no abdominal tenderness.    Musculoskeletal:      Comments:  strength is good bilaterally. Possibly active synovitis across his wrists and the MCP joints of his hands.  strength is good. Skin:     Findings: He has peeling skin especially over his flexor wrist area. .   Neurological:      Mental Status: He is oriented to person, place, and time. Assessment & Plan:     1. Severe RF and CCP positive rheumatoid arthritis, starting in the Spring of 2022. Much improved on Enbrel 50 mg once weekly, 20 mg of methotrexate weekly, and leflunomide 10 mg daily. He will go for monitoring and inflammation labs today. He will return to Rheumatology in 3 months. 2.  Bladder neoplasm, receiving BCG treatments for 6 weeks. Enbrel is OK with his oncologist.    3.  Obstructive sleep apnea, using CPAP machine. 4.  Continued cigarette smoking. Down to minimum nicotine vape pen. 5.  Peeling skin over his flexor wrists. Cause unclear.

## 2023-04-26 ENCOUNTER — LAB ENCOUNTER (OUTPATIENT)
Dept: LAB | Age: 68
End: 2023-04-26
Attending: INTERNAL MEDICINE
Payer: MEDICARE

## 2023-04-26 ENCOUNTER — OFFICE VISIT (OUTPATIENT)
Dept: RHEUMATOLOGY | Facility: CLINIC | Age: 68
End: 2023-04-26

## 2023-04-26 VITALS
BODY MASS INDEX: 35.55 KG/M2 | HEIGHT: 74 IN | WEIGHT: 277 LBS | SYSTOLIC BLOOD PRESSURE: 128 MMHG | HEART RATE: 73 BPM | DIASTOLIC BLOOD PRESSURE: 78 MMHG

## 2023-04-26 DIAGNOSIS — Z51.81 THERAPEUTIC DRUG MONITORING: ICD-10-CM

## 2023-04-26 DIAGNOSIS — M05.79 RHEUMATOID ARTHRITIS INVOLVING MULTIPLE SITES WITH POSITIVE RHEUMATOID FACTOR (HCC): ICD-10-CM

## 2023-04-26 DIAGNOSIS — M05.79 RHEUMATOID ARTHRITIS INVOLVING MULTIPLE SITES WITH POSITIVE RHEUMATOID FACTOR (HCC): Primary | ICD-10-CM

## 2023-04-26 LAB
ALBUMIN SERPL-MCNC: 3.5 G/DL (ref 3.4–5)
AST SERPL-CCNC: 21 U/L (ref 15–37)
BASOPHILS # BLD AUTO: 0.04 X10(3) UL (ref 0–0.2)
BASOPHILS NFR BLD AUTO: 0.6 %
CREAT BLD-MCNC: 1.21 MG/DL
CRP SERPL-MCNC: 0.58 MG/DL (ref ?–0.3)
DEPRECATED RDW RBC AUTO: 53 FL (ref 35.1–46.3)
EOSINOPHIL # BLD AUTO: 0.16 X10(3) UL (ref 0–0.7)
EOSINOPHIL NFR BLD AUTO: 2.5 %
ERYTHROCYTE [DISTWIDTH] IN BLOOD BY AUTOMATED COUNT: 15.9 % (ref 11–15)
ERYTHROCYTE [SEDIMENTATION RATE] IN BLOOD: 9 MM/HR
GFR SERPLBLD BASED ON 1.73 SQ M-ARVRAT: 65 ML/MIN/1.73M2 (ref 60–?)
HCT VFR BLD AUTO: 42.2 %
HGB BLD-MCNC: 13.6 G/DL
IMM GRANULOCYTES # BLD AUTO: 0.01 X10(3) UL (ref 0–1)
IMM GRANULOCYTES NFR BLD: 0.2 %
LYMPHOCYTES # BLD AUTO: 2.3 X10(3) UL (ref 1–4)
LYMPHOCYTES NFR BLD AUTO: 35.5 %
MCH RBC QN AUTO: 29.4 PG (ref 26–34)
MCHC RBC AUTO-ENTMCNC: 32.2 G/DL (ref 31–37)
MCV RBC AUTO: 91.3 FL
MONOCYTES # BLD AUTO: 0.69 X10(3) UL (ref 0.1–1)
MONOCYTES NFR BLD AUTO: 10.7 %
NEUTROPHILS # BLD AUTO: 3.27 X10 (3) UL (ref 1.5–7.7)
NEUTROPHILS # BLD AUTO: 3.27 X10(3) UL (ref 1.5–7.7)
NEUTROPHILS NFR BLD AUTO: 50.5 %
PLATELET # BLD AUTO: 239 10(3)UL (ref 150–450)
RBC # BLD AUTO: 4.62 X10(6)UL
WBC # BLD AUTO: 6.5 X10(3) UL (ref 4–11)

## 2023-04-26 PROCEDURE — 99214 OFFICE O/P EST MOD 30 MIN: CPT | Performed by: INTERNAL MEDICINE

## 2023-04-26 PROCEDURE — 84450 TRANSFERASE (AST) (SGOT): CPT

## 2023-04-26 PROCEDURE — 36415 COLL VENOUS BLD VENIPUNCTURE: CPT

## 2023-04-26 PROCEDURE — 86140 C-REACTIVE PROTEIN: CPT

## 2023-04-26 PROCEDURE — 82565 ASSAY OF CREATININE: CPT

## 2023-04-26 PROCEDURE — 3008F BODY MASS INDEX DOCD: CPT | Performed by: INTERNAL MEDICINE

## 2023-04-26 PROCEDURE — 3074F SYST BP LT 130 MM HG: CPT | Performed by: INTERNAL MEDICINE

## 2023-04-26 PROCEDURE — 82040 ASSAY OF SERUM ALBUMIN: CPT

## 2023-04-26 PROCEDURE — 3078F DIAST BP <80 MM HG: CPT | Performed by: INTERNAL MEDICINE

## 2023-04-26 PROCEDURE — 85652 RBC SED RATE AUTOMATED: CPT

## 2023-04-26 PROCEDURE — 85025 COMPLETE CBC W/AUTO DIFF WBC: CPT

## 2023-04-26 RX ORDER — LEFLUNOMIDE 10 MG/1
TABLET ORAL
Qty: 90 TABLET | Refills: 1 | Status: SHIPPED | OUTPATIENT
Start: 2023-04-26

## 2023-04-26 RX ORDER — METHOTREXATE 2.5 MG/1
TABLET ORAL
Qty: 104 TABLET | Refills: 1 | Status: SHIPPED | OUTPATIENT
Start: 2023-04-26

## 2023-07-17 RX ORDER — MEDROXYPROGESTERONE ACETATE 150 MG/ML
50 INJECTION, SUSPENSION INTRAMUSCULAR
Qty: 4 ML | Refills: 5 | Status: SHIPPED | OUTPATIENT
Start: 2023-07-17

## 2023-07-17 NOTE — TELEPHONE ENCOUNTER
LOV:  4/26/2023  Future Appointments   Date Time Provider Mert Dinh   8/3/2023  2:50 PM Nancy Lockhart MD [facility] [place]     Labs:    Component      Latest Ref Rng 4/26/2023   WBC      4.0 - 11.0 x10(3) uL 6.5    RBC      3.80 - 5.80 x10(6)uL 4.62    Hemoglobin      13.0 - 17.5 g/dL 13.6    Hematocrit      39.0 - 53.0 % 42.2    MCV      80.0 - 100.0 fL 91.3    MCH      26.0 - 34.0 pg 29.4    MCHC      31.0 - 37.0 g/dL 32.2    RDW-SD      35.1 - 46.3 fL 53.0 (H)    RDW      11.0 - 15.0 % 15.9 (H)    Platelet Count      332.3 - 450.0 10(3)uL 239.0    Prelim Neutrophil Abs      1.50 - 7.70 x10 (3) uL 3.27    Neutrophils Absolute      1.50 - 7.70 x10(3) uL 3.27    Lymphocytes Absolute      1.00 - 4.00 x10(3) uL 2.30    Monocytes Absolute      0.10 - 1.00 x10(3) uL 0.69    Eosinophils Absolute      0.00 - 0.70 x10(3) uL 0.16    Basophils Absolute      0.00 - 0.20 x10(3) uL 0.04    Immature Granulocyte Absolute      0.00 - 1.00 x10(3) uL 0.01    Neutrophils %      % 50.5    Lymphocytes %      % 35.5    Monocytes %      % 10.7    Eosinophils %      % 2.5    Basophils %      % 0.6    Immature Granulocyte %      % 0.2    CREATININE      0.70 - 1.30 mg/dL 1.21    eGFR-Cr      >=60 mL/min/1.73m2 65    SED RATE      0 - 20 mm/Hr 9    C-REACTIVE PROTEIN      <0.30 mg/dL 0.58 (H)    AST (SGOT)      15 - 37 U/L 21    Albumin      3.4 - 5.0 g/dL 3.5       Legend:  (H) High

## 2023-07-17 NOTE — TELEPHONE ENCOUNTER
Etanercept (ENBREL SURECLICK) 50 MG/ML Subcutaneous Solution Auto-injector, Inject 50 mg into the skin every 7 days. , Disp: 4 mL, Rfl:

## 2023-08-03 ENCOUNTER — OFFICE VISIT (OUTPATIENT)
Dept: RHEUMATOLOGY | Facility: CLINIC | Age: 68
End: 2023-08-03

## 2023-08-03 ENCOUNTER — LAB ENCOUNTER (OUTPATIENT)
Dept: LAB | Age: 68
End: 2023-08-03
Attending: INTERNAL MEDICINE
Payer: MEDICARE

## 2023-08-03 VITALS
HEIGHT: 74 IN | HEART RATE: 70 BPM | WEIGHT: 263 LBS | BODY MASS INDEX: 33.75 KG/M2 | DIASTOLIC BLOOD PRESSURE: 92 MMHG | SYSTOLIC BLOOD PRESSURE: 144 MMHG

## 2023-08-03 DIAGNOSIS — M05.79 RHEUMATOID ARTHRITIS INVOLVING MULTIPLE SITES WITH POSITIVE RHEUMATOID FACTOR (HCC): ICD-10-CM

## 2023-08-03 DIAGNOSIS — Z51.81 THERAPEUTIC DRUG MONITORING: ICD-10-CM

## 2023-08-03 DIAGNOSIS — M05.79 RHEUMATOID ARTHRITIS INVOLVING MULTIPLE SITES WITH POSITIVE RHEUMATOID FACTOR (HCC): Primary | ICD-10-CM

## 2023-08-03 LAB
DEPRECATED RDW RBC AUTO: 55.4 FL (ref 35.1–46.3)
ERYTHROCYTE [DISTWIDTH] IN BLOOD BY AUTOMATED COUNT: 16.7 % (ref 11–15)
ERYTHROCYTE [SEDIMENTATION RATE] IN BLOOD: 7 MM/HR
HCT VFR BLD AUTO: 41.9 %
HGB BLD-MCNC: 13.9 G/DL
MCH RBC QN AUTO: 29.9 PG (ref 26–34)
MCHC RBC AUTO-ENTMCNC: 33.2 G/DL (ref 31–37)
MCV RBC AUTO: 90.1 FL
PLATELET # BLD AUTO: 202 10(3)UL (ref 150–450)
RBC # BLD AUTO: 4.65 X10(6)UL
WBC # BLD AUTO: 6.4 X10(3) UL (ref 4–11)

## 2023-08-03 PROCEDURE — 84450 TRANSFERASE (AST) (SGOT): CPT

## 2023-08-03 PROCEDURE — 3077F SYST BP >= 140 MM HG: CPT | Performed by: INTERNAL MEDICINE

## 2023-08-03 PROCEDURE — 3008F BODY MASS INDEX DOCD: CPT | Performed by: INTERNAL MEDICINE

## 2023-08-03 PROCEDURE — 36415 COLL VENOUS BLD VENIPUNCTURE: CPT

## 2023-08-03 PROCEDURE — 86140 C-REACTIVE PROTEIN: CPT

## 2023-08-03 PROCEDURE — 3080F DIAST BP >= 90 MM HG: CPT | Performed by: INTERNAL MEDICINE

## 2023-08-03 PROCEDURE — 85027 COMPLETE CBC AUTOMATED: CPT

## 2023-08-03 PROCEDURE — 84460 ALANINE AMINO (ALT) (SGPT): CPT

## 2023-08-03 PROCEDURE — 82565 ASSAY OF CREATININE: CPT

## 2023-08-03 PROCEDURE — 99215 OFFICE O/P EST HI 40 MIN: CPT | Performed by: INTERNAL MEDICINE

## 2023-08-03 PROCEDURE — 85652 RBC SED RATE AUTOMATED: CPT

## 2023-08-03 RX ORDER — TRIAMCINOLONE ACETONIDE 1 MG/G
CREAM TOPICAL 2 TIMES DAILY
Qty: 45 G | Refills: 3 | Status: SHIPPED | OUTPATIENT
Start: 2023-08-03

## 2023-08-03 NOTE — PATIENT INSTRUCTIONS
Cont. Methotrexate 6 tablets a week for now. Cont. Folic acid 1mg a day   Cont. Leflunomide 10mg a day   Cont. Enbrel 50mg a week   Cont. Turmeric,   Check labs every 3 months   Return to clinic in 3 months   Triamcinalone creatm 0.1 % topical for hands as needed.

## 2023-08-03 NOTE — PROGRESS NOTES
Kira Dubon is a 77year old male. HPI:   Patient presents with:  Rheumatoid Arthritis  Joint Pain  Leg Pain    I had the pleasure of seeing Kira Dubon on 8/3/2023 for follow up. He is re-establishing care after Dr. Dimitry Kramer retired. He was last seen by him on 4/26/2023 . 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 CCP antibody is greater than 340. Overall he ahs been stable. His RA was uncontrolled early 2022 on methotrexate 25 mg weekly alone, so he has been injecting Enbrel 50 mg SureClick once a week, since 3/23/2022. He dropped his methotrexate to 20 mg weekly. His arthritis continues to improve after starting   leflunomide 10 mg daily. In January of 2023. He is concerned, because there has been peeling of his skin, especially over his flexor wrists. There is not a rash. He is using a cream.    He has a hx of bladder cancer. He finished his treatments for bladder neoplasm. Today on 8/3/2023   He is tapering his methotrexate to 15m ga week. He has about 2/10 pain. He was first 285 - and lost about 25 lbs and walking daily. He is on enbrel 50mg a week and leflunomide 10mg a day. He's had side effects prednisone - had bad anxiety and depression with it. In the past he was on prednisone with his bell's palsy. He had bad mood changes. He has been off predniosne. He has hx of bladder cancer. He's actively trying to cut out sweets and he has lost weight and he feels better with this joints. HISTORY:  Past Medical History:   Diagnosis Date    Bladder tumor     Bladder Cancer, non-invasive.     Bleeding hemorrhoid 2/4/2014    Constipation 2/4/2014    Essential hypertension     History of renal stone 2/4/2014    Obesity, unspecified     Rheumatoid arthritis with negative rheumatoid factor ([facility] [address] 6/21/2021    SLEEP APNEA PSG 7-26-10    AHI 23 REM 45, AutoPAP 4-20      Social Hx Reviewed   Family Hx Reviewed     Medications (Active prior to today's visit):  Current Outpatient Medications   Medication Sig Dispense Refill    Turmeric (QC TUMERIC COMPLEX OR) Take 1 tablet by mouth daily. Etanercept (ENBREL SURECLICK) 50 MG/ML Subcutaneous Solution Auto-injector Inject 50 mg into the skin every 7 days. 4 mL 5    leflunomide 10 MG Oral Tab Take one daily. 90 tablet 1    methotrexate 2.5 MG Oral Tab TAKE 8 TABLETS BY MOUTH ONCE A WEEK 104 tablet 1    amLODIPine 5 MG Oral Tab Take 1 tablet (5 mg total) by mouth daily. losartan 100 MG Oral Tab Take 1 tablet (100 mg total) by mouth daily. folic acid 1 MG Oral Tab Take 1 tablet (1 mg total) by mouth daily. 90 tablet 3    Specialty Vitamins Products (PROSTATE OR) Take by mouth daily. predniSONE 5 MG Oral Tab Take one PO Q AM PRN. (Patient not taking: Reported on 4/26/2023) 90 tablet 3    LOSARTAN POTASSIUM-HCTZ 100-12.5 MG Oral Tab TAKE 1 TABLET BY MOUTH DAILY (Patient not taking: Reported on 1/11/2023) 90 tablet 0    Ascorbic Acid (VITAMIN C) 1000 MG Oral Tab Take 1,000 mg by mouth daily. VITAMIN A OR Take by mouth daily. B Complex Vitamins (VITAMIN B-COMPLEX) Oral Tab Take by mouth daily. (Patient not taking: No sig reported)      Multiple Vitamin (MULTIVITAMINS) Oral Tab Take 1 Tab by mouth daily. .cmed  Allergies:    Pcn [Penicillins]       SWELLING    Comment:Throat swells up      ROS:   All other ROS are negative. PHYSICAL EXAM:   HEENT: Clear oropharynx, no oral ulcers, EOM intact, clear sclear, PERRLA, pleasant, no acute distress, no CAD, no neck tendnerness, good ROM,   No rashes  CVS: RRR, no murmurs  RS: CTAB, no crackles, no rhonchi  ABD: Soft Non tender, no HSM felt, BS positive  Joint exam:    strength is good bilaterally. Possibly active synovitis across his wrists and the MCP joints of his hands.  strength is good.   Left knee +1 swelling, not tender   Left ankle tender   B/l ankles +1 swleling   Left shoulder tender but intact abduction. Component      Latest Ref Rng 4/26/2023   WBC      4.0 - 11.0 x10(3) uL 6.5    RBC      3.80 - 5.80 x10(6)uL 4.62    Hemoglobin      13.0 - 17.5 g/dL 13.6    Hematocrit      39.0 - 53.0 % 42.2    MCV      80.0 - 100.0 fL 91.3    MCH      26.0 - 34.0 pg 29.4    MCHC      31.0 - 37.0 g/dL 32.2    RDW-SD      35.1 - 46.3 fL 53.0 (H)    RDW      11.0 - 15.0 % 15.9 (H)    Platelet Count      436.2 - 450.0 10(3)uL 239.0    Prelim Neutrophil Abs      1.50 - 7.70 x10 (3) uL 3.27    Neutrophils Absolute      1.50 - 7.70 x10(3) uL 3.27    Lymphocytes Absolute      1.00 - 4.00 x10(3) uL 2.30    Monocytes Absolute      0.10 - 1.00 x10(3) uL 0.69    Eosinophils Absolute      0.00 - 0.70 x10(3) uL 0.16    Basophils Absolute      0.00 - 0.20 x10(3) uL 0.04    Immature Granulocyte Absolute      0.00 - 1.00 x10(3) uL 0.01    Neutrophils %      % 50.5    Lymphocytes %      % 35.5    Monocytes %      % 10.7    Eosinophils %      % 2.5    Basophils %      % 0.6    Immature Granulocyte %      % 0.2    CREATININE      0.70 - 1.30 mg/dL 1.21    eGFR-Cr      >=60 mL/min/1.73m2 65    SED RATE      0 - 20 mm/Hr 9    C-REACTIVE PROTEIN      <0.30 mg/dL 0.58 (H)    AST (SGOT)      15 - 37 U/L 21    Albumin      3.4 - 5.0 g/dL 3.5       Legend:  (H) High        ASSESSMENT/PLAN:     1. Severe RF and CCP positive rheumatoid arthritis, starting in the Spring of 2022. Much improved on Enbrel 50 mg once weekly, 20 mg of methotrexate weekly, and leflunomide 10 mg daily. He will go for monitoring and inflammation labs today. He is decreasing the methtorexate to 15mg aw [place] but will keep his script the same b/c he has only been on this for 3 weeks. He is feeling ok. But he still puts 8 tablets a week I his pill box and he wants to see if he's really able to decrease it. He also has been trying to lose weight and walking daily. Check labs every 3 months.      He will return to Rheumatology in 3 months. In the past he was on rinoq -     2. Bladder neoplasm, receiving BCG treatments for 6 weeks. Enbrel is OK with his oncologist.    3.  Obstructive sleep apnea, using CPAP machine. 4.  Continued cigarette smoking. Down to minimum nicotine vape pen. 5.  Peeling skin over his flexor wrists. Cause unclear. - triamcinalont cream sent    6. 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     7. Left shoudler tendonitis -     Summary:  Cont. Methotrexate 6 tablets a week for now. Cont. Folic acid 1mg a day   Cont. Leflunomide 10mg a day   Cont. Enbrel 50mg a week   Cont. Turmeric,   Check labs every 3 months   Return to clinic in 3 months   Triamcinalone creatm 0.1 % topical for hands as needed. Opal Caruso MD  8/3/2023   3:12 PM    Total time spent caring for the patient on the day of the encounter:  40 min  This includes pre-charting, reviewing and obtaining results, exam, plan, notes, and counseling.

## 2023-08-04 LAB
ALT SERPL-CCNC: 27 U/L
AST SERPL-CCNC: 18 U/L (ref 15–37)
CREAT BLD-MCNC: 1.03 MG/DL
CRP SERPL-MCNC: 0.75 MG/DL (ref ?–0.3)
EGFRCR SERPLBLD CKD-EPI 2021: 78 ML/MIN/1.73M2 (ref 60–?)

## 2023-10-17 RX ORDER — LEFLUNOMIDE 10 MG/1
TABLET ORAL
Qty: 90 TABLET | Refills: 0 | Status: SHIPPED | OUTPATIENT
Start: 2023-10-17

## 2023-10-17 NOTE — TELEPHONE ENCOUNTER
LOV: 8/3/23  Future Appointments   Date Time Provider Mert Dinh   12/7/2023 11:40 AM Kevin Ro MD [facility] [place] [facility] [place]     Labs:    Component      Latest Ref Rng 8/3/2023   WBC      4.0 - 11.0 x10(3) uL 6.4    RBC      3.80 - 5.80 x10(6)uL 4.65    Hemoglobin      13.0 - 17.5 g/dL 13.9    Hematocrit      39.0 - 53.0 % 41.9    MCV      80.0 - 100.0 fL 90.1    MCH      26.0 - 34.0 pg 29.9    MCHC      31.0 - 37.0 g/dL 33.2    RDW      11.0 - 15.0 % 16.7 (H)    RDW-SD      35.1 - 46.3 fL 55.4 (H)    Platelet Count      222.2 - 450.0 10(3)uL 202.0    CREATININE      0.70 - 1.30 mg/dL 1.03    EGFR      >=60 mL/min/1.73m2 78    SED RATE      0 - 20 mm/Hr 7    C-REACTIVE PROTEIN      <0.30 mg/dL 0.75 (H)    AST (SGOT)      15 - 37 U/L 18    ALT (SGPT)      16 - 61 U/L 27       Legend:  (H) High

## 2023-12-07 ENCOUNTER — LAB ENCOUNTER (OUTPATIENT)
Dept: LAB | Age: 68
End: 2023-12-07
Attending: INTERNAL MEDICINE
Payer: MEDICARE

## 2023-12-07 ENCOUNTER — TELEPHONE (OUTPATIENT)
Dept: RHEUMATOLOGY | Facility: CLINIC | Age: 68
End: 2023-12-07

## 2023-12-07 ENCOUNTER — OFFICE VISIT (OUTPATIENT)
Dept: RHEUMATOLOGY | Facility: CLINIC | Age: 68
End: 2023-12-07
Payer: COMMERCIAL

## 2023-12-07 VITALS — BODY MASS INDEX: 33.29 KG/M2 | HEIGHT: 74 IN | WEIGHT: 259.38 LBS | RESPIRATION RATE: 16 BRPM

## 2023-12-07 DIAGNOSIS — M05.79 RHEUMATOID ARTHRITIS INVOLVING MULTIPLE SITES WITH POSITIVE RHEUMATOID FACTOR (HCC): Primary | ICD-10-CM

## 2023-12-07 DIAGNOSIS — M05.79 RHEUMATOID ARTHRITIS INVOLVING MULTIPLE SITES WITH POSITIVE RHEUMATOID FACTOR (HCC): ICD-10-CM

## 2023-12-07 DIAGNOSIS — Z51.81 THERAPEUTIC DRUG MONITORING: ICD-10-CM

## 2023-12-07 LAB
ALT SERPL-CCNC: 16 U/L
AST SERPL-CCNC: 15 U/L (ref ?–34)
CREAT BLD-MCNC: 1.03 MG/DL
CRP SERPL-MCNC: 0.5 MG/DL (ref ?–1)
DEPRECATED RDW RBC AUTO: 54.4 FL (ref 35.1–46.3)
EGFRCR SERPLBLD CKD-EPI 2021: 78 ML/MIN/1.73M2 (ref 60–?)
ERYTHROCYTE [DISTWIDTH] IN BLOOD BY AUTOMATED COUNT: 16.2 % (ref 11–15)
ERYTHROCYTE [SEDIMENTATION RATE] IN BLOOD: 19 MM/HR
HCT VFR BLD AUTO: 43 %
HGB BLD-MCNC: 14.2 G/DL
MCH RBC QN AUTO: 30.1 PG (ref 26–34)
MCHC RBC AUTO-ENTMCNC: 33 G/DL (ref 31–37)
MCV RBC AUTO: 91.3 FL
PLATELET # BLD AUTO: 228 10(3)UL (ref 150–450)
RBC # BLD AUTO: 4.71 X10(6)UL
WBC # BLD AUTO: 6.3 X10(3) UL (ref 4–11)

## 2023-12-07 PROCEDURE — 99214 OFFICE O/P EST MOD 30 MIN: CPT | Performed by: INTERNAL MEDICINE

## 2023-12-07 PROCEDURE — 84450 TRANSFERASE (AST) (SGOT): CPT

## 2023-12-07 PROCEDURE — 36415 COLL VENOUS BLD VENIPUNCTURE: CPT

## 2023-12-07 PROCEDURE — 82565 ASSAY OF CREATININE: CPT

## 2023-12-07 PROCEDURE — 84460 ALANINE AMINO (ALT) (SGPT): CPT

## 2023-12-07 PROCEDURE — 86140 C-REACTIVE PROTEIN: CPT

## 2023-12-07 PROCEDURE — 85027 COMPLETE CBC AUTOMATED: CPT

## 2023-12-07 PROCEDURE — 3008F BODY MASS INDEX DOCD: CPT | Performed by: INTERNAL MEDICINE

## 2023-12-07 PROCEDURE — 85652 RBC SED RATE AUTOMATED: CPT

## 2023-12-07 PROCEDURE — 86480 TB TEST CELL IMMUN MEASURE: CPT | Performed by: INTERNAL MEDICINE

## 2023-12-07 RX ORDER — DIAZEPAM 5 MG/1
5 TABLET ORAL
COMMUNITY
Start: 2023-09-12

## 2023-12-07 RX ORDER — METHOTREXATE 2.5 MG/1
20 TABLET ORAL WEEKLY
Qty: 104 TABLET | Refills: 1 | Status: SHIPPED | OUTPATIENT
Start: 2023-12-07 | End: 2024-03-06

## 2023-12-07 RX ORDER — LEFLUNOMIDE 10 MG/1
TABLET ORAL
Qty: 90 TABLET | Refills: 0 | Status: SHIPPED | OUTPATIENT
Start: 2023-12-07

## 2023-12-07 RX ORDER — MEDROXYPROGESTERONE ACETATE 150 MG/ML
50 INJECTION, SUSPENSION INTRAMUSCULAR
Qty: 4 ML | Refills: 5 | Status: SHIPPED | OUTPATIENT
Start: 2023-12-07

## 2023-12-07 NOTE — TELEPHONE ENCOUNTER
Called and left a VM to call back. Inform patient Dr. Amairani Arriaga completed form. Form was left at [address] [place] [address] office with our nurse Ashli Munoz. Please let us know if will be picking up or would like us to mail it.

## 2023-12-07 NOTE — PROGRESS NOTES
Jeferson Cervantes is a 77year old male. HPI:     Chief Complaint   Patient presents with    Rheumatoid Arthritis    Medication Follow-Up     NEED TO BE REFILLS    Joint Pain    Knee Pain     B/L     I had the pleasure of seeing Jeferson Cervantes on 8/3/2023 for follow up. He is re-establishing care after Dr. Robert Seymour retired. He was last seen by him on 4/26/2023 . 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 CCP antibody is greater than 340. Overall he ahs been stable. His RA was uncontrolled early 2022 on methotrexate 25 mg weekly alone, so he has been injecting Enbrel 50 mg SureClick once a week, since 3/23/2022. He dropped his methotrexate to 20 mg weekly. His arthritis continues to improve after starting   leflunomide 10 mg daily. In January of 2023. He is concerned, because there has been peeling of his skin, especially over his flexor wrists. There is not a rash. He is using a cream.    He has a hx of bladder cancer in 2022. He finished his treatments for bladder neoplasm. Today on 8/3/2023   He is tapering his methotrexate to 15m ga week. He has about 2/10 pain. He was first 285 - and lost about 25 lbs and walking daily. He is on enbrel 50mg a week and leflunomide 10mg a day. He's had side effects prednisone - had bad anxiety and depression with it. In the past he was on prednisone with his bell's palsy. He had bad mood changes. He has been off predniosne. He has hx of bladder cancer. He's actively trying to cut out sweets and he has lost weight and he feels better with this joints. 12/7/2023   RA dx around 6/2021 - with dr. To Avery. Some days are good and some days are bad. He takes enbrel on Friday and then ok by Wednesday but then he feels a little more joint stiffness coming back. Thursdays'  he takes methotrexate. He has days wehre he can't walk. He has days where he is ok.    He has a disabled license that he showed me. He states dr. Carla Wu filled out his disability paperwork -      HISTORY:  Past Medical History:   Diagnosis Date    Bladder tumor     Bladder Cancer, non-invasive. Bleeding hemorrhoid 2/4/2014    Constipation 2/4/2014    Essential hypertension     History of renal stone 2/4/2014    Obesity, unspecified     Rheumatoid arthritis with negative rheumatoid factor ([facility] [address] 6/21/2021    SLEEP APNEA PSG 7-26-10    AHI 23 REM 45, AutoPAP 4-20      Social Hx Reviewed   Family Hx Reviewed     Medications (Active prior to today's visit):  Current Outpatient Medications   Medication Sig Dispense Refill    leflunomide 10 MG Oral Tab Take one daily. 90 tablet 0    Turmeric (QC TUMERIC COMPLEX OR) Take 1 tablet by mouth daily. triamcinolone 0.1 % External Cream Apply topically 2 (two) times daily. 45 g 3    Etanercept (ENBREL SURECLICK) 50 MG/ML Subcutaneous Solution Auto-injector Inject 50 mg into the skin every 7 days. 4 mL 5    methotrexate 2.5 MG Oral Tab TAKE 8 TABLETS BY MOUTH ONCE A WEEK 104 tablet 1    amLODIPine 5 MG Oral Tab Take 1 tablet (5 mg total) by mouth daily. losartan 100 MG Oral Tab Take 1 tablet (100 mg total) by mouth daily. folic acid 1 MG Oral Tab Take 1 tablet (1 mg total) by mouth daily. 90 tablet 3    Specialty Vitamins Products (PROSTATE OR) Take by mouth daily. diazePAM 5 MG Oral Tab Take 1 tablet (5 mg total) by mouth. (Patient not taking: Reported on 12/7/2023)       . cmed  Allergies: Allergies   Allergen Reactions    Pcn [Penicillins] SWELLING     Throat swells up         ROS:   All other ROS are negative. PHYSICAL EXAM:   HEENT: Clear oropharynx, no oral ulcers, EOM intact, clear sclear, PERRLA, pleasant, no acute distress, no CAD, no neck tendnerness, good ROM,   No rashes  CVS: RRR, no murmurs  RS: CTAB, no crackles, no rhonchi  ABD: Soft Non tender, no HSM felt, BS positive  Joint exam:    strength is good bilaterally.   Possibly active synovitis across his wrists and the MCP joints of his hands.  strength is good. Left knee +1 swelling, not tender   Left ankle tender   B/l ankles +1 swleling   Left shoulder tender but intact abduction.      Component      Latest Ref Rng 4/26/2023   WBC      4.0 - 11.0 x10(3) uL 6.5    RBC      3.80 - 5.80 x10(6)uL 4.62    Hemoglobin      13.0 - 17.5 g/dL 13.6    Hematocrit      39.0 - 53.0 % 42.2    MCV      80.0 - 100.0 fL 91.3    MCH      26.0 - 34.0 pg 29.4    MCHC      31.0 - 37.0 g/dL 32.2    RDW-SD      35.1 - 46.3 fL 53.0 (H)    RDW      11.0 - 15.0 % 15.9 (H)    Platelet Count      693.8 - 450.0 10(3)uL 239.0    Prelim Neutrophil Abs      1.50 - 7.70 x10 (3) uL 3.27    Neutrophils Absolute      1.50 - 7.70 x10(3) uL 3.27    Lymphocytes Absolute      1.00 - 4.00 x10(3) uL 2.30    Monocytes Absolute      0.10 - 1.00 x10(3) uL 0.69    Eosinophils Absolute      0.00 - 0.70 x10(3) uL 0.16    Basophils Absolute      0.00 - 0.20 x10(3) uL 0.04    Immature Granulocyte Absolute      0.00 - 1.00 x10(3) uL 0.01    Neutrophils %      % 50.5    Lymphocytes %      % 35.5    Monocytes %      % 10.7    Eosinophils %      % 2.5    Basophils %      % 0.6    Immature Granulocyte %      % 0.2    CREATININE      0.70 - 1.30 mg/dL 1.21    eGFR-Cr      >=60 mL/min/1.73m2 65    SED RATE      0 - 20 mm/Hr 9    C-REACTIVE PROTEIN      <0.30 mg/dL 0.58 (H)    AST (SGOT)      15 - 37 U/L 21    Albumin      3.4 - 5.0 g/dL 3.5       Legend:  (H) High    Component      Latest Ref Rng 8/3/2023   WBC      4.0 - 11.0 x10(3) uL 6.4    RBC      3.80 - 5.80 x10(6)uL 4.65    Hemoglobin      13.0 - 17.5 g/dL 13.9    Hematocrit      39.0 - 53.0 % 41.9    MCV      80.0 - 100.0 fL 90.1    MCH      26.0 - 34.0 pg 29.9    MCHC      31.0 - 37.0 g/dL 33.2    RDW      11.0 - 15.0 % 16.7 (H)    RDW-SD      35.1 - 46.3 fL 55.4 (H)    Platelet Count      455.2 - 450.0 10(3)uL 202.0    CREATININE      0.70 - 1.30 mg/dL 1.03    EGFR      >=60 mL/min/1.73m2 78    SED RATE      0 - 20 mm/Hr 7    C-REACTIVE PROTEIN      <0.30 mg/dL 0.75 (H)    AST (SGOT)      15 - 37 U/L 18    ALT (SGPT)      16 - 61 U/L 27       Legend:  (H) High    ASSESSMENT/PLAN:     1. Severe RF and CCP positive rheumatoid arthritis, starting in the Spring of 2022. Stable,   - Much improved on Enbrel 50 mg once weekly, 20 mg of methotrexate weekly, and leflunomide 10 mg daily. He will go for monitoring and inflammation labs today. He is decreasing the methtorexate to 15mg aw [place] but will keep his script the same b/c he has only been on this for 3 weeks. He is feeling ok. But he still puts 8 tablets a week I his pill box and he wants to see if he's really able to decrease it. He also has been trying to lose weight and walking daily. Check labs every 3 months. Rtc  in 3 months. In the past he was on eFuneraloVycon -  Filled out tax paperwork for his disability      2. Bladder neoplasm, receiving BCG treatments for 6 weeks. Enbrel is OK with his oncologist.    3.  Obstructive sleep apnea, using CPAP machine. 4.  Continued cigarette smoking. Down to minimum nicotine vape pen. 5.  Peeling skin over his flexor wrists. Cause unclear. - triamcinalont cream sent    6. 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     7. Left shoudler tendonitis -     Summary:  Cont. Methotrexate 6 tablets a week for now. Cont. Folic acid 1mg a day   Cont. Leflunomide 10mg a day   Cont. Enbrel 50mg a week   Cont. Turmeric,   Check labs every 3 months   Return to clinic in 3 months   Triamcinalone creatm 0.1 % topical for hands as needed.      Kasia Craven MD  12/7/2023   12:31 PM  - [place]ed [place] [employer]

## 2023-12-11 LAB
M TB IFN-G CD4+ T-CELLS BLD-ACNC: 0.04 IU/ML
M TB TUBERC IFN-G BLD QL: NEGATIVE
M TB TUBERC IGNF/MITOGEN IGNF CONTROL: >10 IU/ML
QFT TB1 AG MINUS NIL: 0.03 IU/ML
QFT TB2 AG MINUS NIL: 0.01 IU/ML

## 2024-01-02 RX ORDER — FOLIC ACID 1 MG/1
1 TABLET ORAL DAILY
Qty: 90 TABLET | Refills: 3 | Status: SHIPPED | OUTPATIENT
Start: 2024-01-02

## 2024-01-02 NOTE — TELEPHONE ENCOUNTER
Current Outpatient Medications   Medication Sig Dispense Refill    folic acid 1 MG Oral Tab Take 1 tablet (1 mg total) by mouth daily. 90 tablet 3

## 2024-01-02 NOTE — TELEPHONE ENCOUNTER
LOV:  12/7/23  Future Appointments   Date Time Provider Department Center   4/4/2024 11:40 AM Palaniswami, Purani, MD [facility] [place]     Labs:    Component      Latest Ref Rng 12/7/2023   WBC      4.0 - 11.0 x10(3) uL 6.3    RBC      3.80 - 5.80 x10(6)uL 4.71    Hemoglobin      13.0 - 17.5 g/dL 14.2    Hematocrit      39.0 - 53.0 % 43.0    MCV      80.0 - 100.0 fL 91.3    MCH      26.0 - 34.0 pg 30.1    MCHC      31.0 - 37.0 g/dL 33.0    RDW      11.0 - 15.0 % 16.2 (H)    RDW-SD      35.1 - 46.3 fL 54.4 (H)    Platelet Count      150.0 - 450.0 10(3)uL 228.0    Quantiferon TB NIL      IU/mL 0.04    Quantiferon-TB1 Minus NIL      IU/mL 0.03    Quantiferon-TB2 Minus NIL      IU/mL 0.01    Quantiferon TB Mitogen minus NIL      IU/mL >10.00    Quantiferon TB Result      Negative  Negative    CREATININE      0.70 - 1.30 mg/dL 1.03    EGFR      >=60 mL/min/1.73m2 78    SED RATE      0 - 20 mm/Hr 19    C-REACTIVE PROTEIN      <1.00 mg/dL 0.50    AST (SGOT)      <=34 U/L 15    ALT (SGPT)      10 - 49 U/L 16       Legend:  (H) High

## 2024-04-08 RX ORDER — LEFLUNOMIDE 10 MG/1
TABLET ORAL
Qty: 90 TABLET | Refills: 0 | Status: SHIPPED | OUTPATIENT
Start: 2024-04-08

## 2024-04-08 NOTE — TELEPHONE ENCOUNTER
Requested Prescriptions     Pending Prescriptions Disp Refills    leflunomide 10 MG Oral Tab [Pharmacy Med Name: Leflunomide 10 MG Oral Tablet] 90 tablet 0     Sig: Take 1 tablet by mouth once daily     LF: 12/7/23  LOV: 12/7/23  No future appointments.  Labs:   Component      Latest Ref Rng 12/7/2023   WBC      4.0 - 11.0 x10(3) uL 6.3    RBC      3.80 - 5.80 x10(6)uL 4.71    Hemoglobin      13.0 - 17.5 g/dL 14.2    Hematocrit      39.0 - 53.0 % 43.0    MCV      80.0 - 100.0 fL 91.3    MCH      26.0 - 34.0 pg 30.1    MCHC      31.0 - 37.0 g/dL 33.0    RDW      11.0 - 15.0 % 16.2 (H)    RDW-SD      35.1 - 46.3 fL 54.4 (H)    Platelet Count      150.0 - 450.0 10(3)uL 228.0    Quantiferon TB NIL      IU/mL 0.04    Quantiferon-TB1 Minus NIL      IU/mL 0.03    Quantiferon-TB2 Minus NIL      IU/mL 0.01    Quantiferon TB Mitogen minus NIL      IU/mL >10.00    Quantiferon TB Result      Negative  Negative    CREATININE      0.70 - 1.30 mg/dL 1.03    EGFR      >=60 mL/min/1.73m2 78    SED RATE      0 - 20 mm/Hr 19    C-REACTIVE PROTEIN      <1.00 mg/dL 0.50    AST (SGOT)      <=34 U/L 15    ALT (SGPT)      10 - 49 U/L 16       Legend:  (H) High  Summary:  Cont. Methotrexate 6 tablets a week for now.   Cont. Folic acid 1mg a day   Cont. Leflunomide 10mg a day   Cont. Enbrel 50mg a week   Cont. Turmeric,   Check labs every 3 months   Return to clinic in 3 months   Triamcinalone creatm 0.1 % topical for hands as needed.      Ivonne Gifford MD  12/7/2023   12:31 PM  - [place] [employer]

## 2024-06-07 NOTE — TELEPHONE ENCOUNTER
LOV: 12/7/23  Last Refilled:#4ml, 5rfs 12/7/23    Summary:  Cont. Methotrexate 6 tablets a week for now.   Cont. Folic acid 1mg a day   Cont. Leflunomide 10mg a day   Cont. Enbrel 50mg a week   Cont. Turmeric,   Check labs every 3 months   Return to clinic in 3 months   Triamcinalone creatm 0.1 % topical for hands as needed.      Ivonne Gifford MD  12/7/2023   12:31 PM  - Reviewed [place] information  thr  Please advise.

## 2024-06-08 RX ORDER — MEDROXYPROGESTERONE ACETATE 150 MG/ML
INJECTION, SUSPENSION INTRAMUSCULAR
Qty: 4 ML | Refills: 5 | Status: SHIPPED | OUTPATIENT
Start: 2024-06-08

## 2024-06-19 ENCOUNTER — LAB ENCOUNTER (OUTPATIENT)
Dept: LAB | Facility: HOSPITAL | Age: 69
End: 2024-06-19
Attending: INTERNAL MEDICINE

## 2024-06-19 ENCOUNTER — OFFICE VISIT (OUTPATIENT)
Dept: RHEUMATOLOGY | Facility: CLINIC | Age: 69
End: 2024-06-19

## 2024-06-19 VITALS
RESPIRATION RATE: 16 BRPM | HEIGHT: 74 IN | SYSTOLIC BLOOD PRESSURE: 129 MMHG | WEIGHT: 260.13 LBS | BODY MASS INDEX: 33.38 KG/M2 | DIASTOLIC BLOOD PRESSURE: 90 MMHG | HEART RATE: 72 BPM

## 2024-06-19 DIAGNOSIS — M05.79 RHEUMATOID ARTHRITIS INVOLVING MULTIPLE SITES WITH POSITIVE RHEUMATOID FACTOR (HCC): ICD-10-CM

## 2024-06-19 DIAGNOSIS — M05.79 RHEUMATOID ARTHRITIS INVOLVING MULTIPLE SITES WITH POSITIVE RHEUMATOID FACTOR (HCC): Primary | ICD-10-CM

## 2024-06-19 DIAGNOSIS — Z51.81 THERAPEUTIC DRUG MONITORING: ICD-10-CM

## 2024-06-19 LAB
ALT SERPL-CCNC: 20 U/L
AST SERPL-CCNC: 16 U/L (ref ?–34)
CREAT BLD-MCNC: 1.02 MG/DL
CRP SERPL-MCNC: 1 MG/DL (ref ?–1)
DEPRECATED RDW RBC AUTO: 51.6 FL (ref 35.1–46.3)
EGFRCR SERPLBLD CKD-EPI 2021: 79 ML/MIN/1.73M2 (ref 60–?)
ERYTHROCYTE [DISTWIDTH] IN BLOOD BY AUTOMATED COUNT: 15.8 % (ref 11–15)
ERYTHROCYTE [SEDIMENTATION RATE] IN BLOOD: 19 MM/HR
HCT VFR BLD AUTO: 41.5 %
HGB BLD-MCNC: 13.9 G/DL
MCH RBC QN AUTO: 30.2 PG (ref 26–34)
MCHC RBC AUTO-ENTMCNC: 33.5 G/DL (ref 31–37)
MCV RBC AUTO: 90 FL
PLATELET # BLD AUTO: 240 10(3)UL (ref 150–450)
RBC # BLD AUTO: 4.61 X10(6)UL
WBC # BLD AUTO: 5.5 X10(3) UL (ref 4–11)

## 2024-06-19 PROCEDURE — 99214 OFFICE O/P EST MOD 30 MIN: CPT | Performed by: INTERNAL MEDICINE

## 2024-06-19 PROCEDURE — 85652 RBC SED RATE AUTOMATED: CPT

## 2024-06-19 PROCEDURE — 86140 C-REACTIVE PROTEIN: CPT

## 2024-06-19 PROCEDURE — 84450 TRANSFERASE (AST) (SGOT): CPT

## 2024-06-19 PROCEDURE — 36415 COLL VENOUS BLD VENIPUNCTURE: CPT

## 2024-06-19 PROCEDURE — 84460 ALANINE AMINO (ALT) (SGPT): CPT

## 2024-06-19 PROCEDURE — 85027 COMPLETE CBC AUTOMATED: CPT

## 2024-06-19 PROCEDURE — 82565 ASSAY OF CREATININE: CPT

## 2024-06-19 RX ORDER — METHOTREXATE 2.5 MG/1
20 TABLET ORAL WEEKLY
COMMUNITY
End: 2024-06-19

## 2024-06-19 RX ORDER — METHOTREXATE 2.5 MG/1
15 TABLET ORAL WEEKLY
Qty: 78 TABLET | Refills: 1 | Status: SHIPPED | OUTPATIENT
Start: 2024-06-19

## 2024-06-19 NOTE — PROGRESS NOTES
Allen Verma Jr. is a 68 year old male.    HPI:     Chief Complaint   Patient presents with    Rheumatoid Arthritis    Medication Follow-Up     I had the pleasure of seeing Allen Verma Jr. on 8/3/2023 for follow up. He is re-establishing care after Dr. Villaseñor retired.   He was last seen by him on 4/26/2023 .    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 CCP antibody is greater than 340.    Overall he ahs been stable.   His RA was uncontrolled early 2022 on methotrexate 25 mg weekly alone, so he has been injecting Enbrel 50 mg SureClick once a week, since 3/23/2022.  He dropped his methotrexate to 20 mg weekly.     His arthritis continues to improve after starting   leflunomide 10 mg daily. In January of 2023.  He is concerned, because there has been peeling of his skin, especially over his flexor wrists.  There is not a rash.  He is using a cream.    He has a hx of bladder cancer in 2022.    He finished his treatments for bladder neoplasm.      Today on 8/3/2023   He is tapering his methotrexate to 15m ga week. He has about 2/10 pain.   He was first 285 - and lost about 25 lbs and walking daily.   He is on enbrel 50mg a week and leflunomide 10mg a day.   He's had side effects prednisone - had bad anxiety and depression with it.   In the past he was on prednisone with his bell's palsy. He had bad mood changes.   He has been off predniosne.     He has hx of bladder cancer.     He's actively trying to cut out sweets and he has lost weight and he feels better with this joints.     12/7/2023   RA dx around 6/2021 - with dr. Butler.   Some days are good and some days are bad.   He takes enbrel on Friday and then ok by Wednesday but then he feels a little more joint stiffness coming back. Thursdays'  he takes methotrexate.   He has days wehre he can't walk.   He has days where he is ok.   He has a disabled license that he showed me. He states dr. Villaseñor  filled out his disability paperwork -    6/19/2024  He's doing ok. He is on the enbrel 50mg a week , methotrexate 8 tablets a week and leflunomide 10mg ad ay.   He tried to wean hi meds but he skipped the enbrel one week and methtorexate and by tthe mid week he had more pain.         HISTORY:  Past Medical History:    Bladder tumor    Bladder Cancer, non-invasive.    Bleeding hemorrhoid    Constipation    Essential hypertension    History of renal stone    Obesity, unspecified    Rheumatoid arthritis with negative rheumatoid factor (HCC)    SLEEP APNEA    AHI 23 REM 45, AutoPAP 4-20      Social Hx Reviewed   Family Hx Reviewed     Medications (Active prior to today's visit):  Current Outpatient Medications   Medication Sig Dispense Refill    methotrexate 2.5 MG Oral Tab Take 8 tablets (20 mg total) by mouth once a week.      ENBREL SURECLICK 50 MG/ML Subcutaneous Solution Auto-injector INJECT 50MG (1 ML) INTO THE SKIN EVERY 7 DAYS 4 mL 5    leflunomide 10 MG Oral Tab Take 1 tablet by mouth once daily 90 tablet 0    folic acid 1 MG Oral Tab Take 1 tablet (1 mg total) by mouth daily. 90 tablet 3    Turmeric (QC TUMERIC COMPLEX OR) Take 1 tablet by mouth daily.      triamcinolone 0.1 % External Cream Apply topically 2 (two) times daily. 45 g 3    amLODIPine 5 MG Oral Tab Take 1 tablet (5 mg total) by mouth daily.      losartan 100 MG Oral Tab Take 1 tablet (100 mg total) by mouth daily.      Specialty Vitamins Products (PROSTATE OR) Take by mouth daily.      diazePAM 5 MG Oral Tab Take 1 tablet (5 mg total) by mouth. (Patient not taking: Reported on 12/7/2023)       .cmed  Allergies:  Allergies   Allergen Reactions    Pcn [Penicillins] SWELLING     Throat swells up         ROS:   All other ROS are negative.     PHYSICAL EXAM:   HEENT: Clear oropharynx, no oral ulcers, EOM intact, clear sclear, PERRLA, pleasant, no acute distress, no CAD, no neck tendnerness, good ROM,   No rashes  CVS: RRR, no murmurs  RS: CTAB, no  crackles, no rhonchi  ABD: Soft Non tender, no HSM felt, BS positive  Joint exam:    strength is good bilaterally.  Possibly active synovitis across his wrists and the MCP joints of his hands.   strength is good.  Left knee +1 swelling, not tender   Left ankle tender   B/l ankles +1 swleling   Left shoulder tender but intact abduction.     Component      Latest Ref Rng 4/26/2023   WBC      4.0 - 11.0 x10(3) uL 6.5    RBC      3.80 - 5.80 x10(6)uL 4.62    Hemoglobin      13.0 - 17.5 g/dL 13.6    Hematocrit      39.0 - 53.0 % 42.2    MCV      80.0 - 100.0 fL 91.3    MCH      26.0 - 34.0 pg 29.4    MCHC      31.0 - 37.0 g/dL 32.2    RDW-SD      35.1 - 46.3 fL 53.0 (H)    RDW      11.0 - 15.0 % 15.9 (H)    Platelet Count      150.0 - 450.0 10(3)uL 239.0    Prelim Neutrophil Abs      1.50 - 7.70 x10 (3) uL 3.27    Neutrophils Absolute      1.50 - 7.70 x10(3) uL 3.27    Lymphocytes Absolute      1.00 - 4.00 x10(3) uL 2.30    Monocytes Absolute      0.10 - 1.00 x10(3) uL 0.69    Eosinophils Absolute      0.00 - 0.70 x10(3) uL 0.16    Basophils Absolute      0.00 - 0.20 x10(3) uL 0.04    Immature Granulocyte Absolute      0.00 - 1.00 x10(3) uL 0.01    Neutrophils %      % 50.5    Lymphocytes %      % 35.5    Monocytes %      % 10.7    Eosinophils %      % 2.5    Basophils %      % 0.6    Immature Granulocyte %      % 0.2    CREATININE      0.70 - 1.30 mg/dL 1.21    eGFR-Cr      >=60 mL/min/1.73m2 65    SED RATE      0 - 20 mm/Hr 9    C-REACTIVE PROTEIN      <0.30 mg/dL 0.58 (H)    AST (SGOT)      15 - 37 U/L 21    Albumin      3.4 - 5.0 g/dL 3.5       Component      Latest Ref Rng 12/7/2023   WBC      4.0 - 11.0 x10(3) uL 6.3    RBC      3.80 - 5.80 x10(6)uL 4.71    Hemoglobin      13.0 - 17.5 g/dL 14.2    Hematocrit      39.0 - 53.0 % 43.0    MCV      80.0 - 100.0 fL 91.3    MCH      26.0 - 34.0 pg 30.1    MCHC      31.0 - 37.0 g/dL 33.0    RDW      11.0 - 15.0 % 16.2 (H)    RDW-SD      35.1 - 46.3 fL 54.4 (H)     Platelet Count      150.0 - 450.0 10(3)uL 228.0    Quantiferon TB NIL      IU/mL 0.04    Quantiferon-TB1 Minus NIL      IU/mL 0.03    Quantiferon-TB2 Minus NIL      IU/mL 0.01    Quantiferon TB Mitogen minus NIL      IU/mL >10.00    Quantiferon TB Result      Negative  Negative    CREATININE      0.70 - 1.30 mg/dL 1.03    EGFR      >=60 mL/min/1.73m2 78    SED RATE      0 - 20 mm/Hr 19    C-REACTIVE PROTEIN      <1.00 mg/dL 0.50    AST (SGOT)      <=34 U/L 15    ALT (SGPT)      10 - 49 U/L 16       2/16/24 10:55 AM    Patient Fasting? Yes   Glucose  74 - 109 mg/dL 85   Blood Urea Nitrogen  6.0 - 20.0 mg/dL 17.0   Creatinine  0.70 - 1.20 mg/dL 0.91   BUN/CREAT Ratio  10.0 - 20.0 19.0   Sodium  136 - 145 mmol/L 141   Potassium  3.5 - 5.2 mmol/L 4.6   Comment: Specimen slightly hemolyzed.   Chloride  98 - 107 mmol/L 107   Carbon Dioxide  22.0 - 29.0 mmol/L 26.6   Calcium  8.6 - 10.3 mg/dL 8.5 Low    Corrected Calcium  8.3 - 10.3 mg/dL 8.6   Comment: Corrected Calcium Formula: ((4.0 - Albumin) x 0.8) + Calcium)  Note: Calculation is only valid when Albumin is less than 4.0 g/dL  The above calculated calcium value corrects for a low albumin level.  Correlation with the patient's clinical status is recommended to determine if this calculated value better represents the bioavailability of calcium. This calculation may not be accurate in all clinical situations.  Ionized calcium may provide a more accurate assessment of the patient's functional calcium level and requires an additional draw.   Total Protein  6.4 - 8.3 g/dL 6.3 Low    Albumin  3.5 - 5.2 g/dL 3.9   Bilirubin, Total  0.00 - 1.20 mg/dL 0.38   Alkaline Phosphatase  45 - 117 U/L 74   AST  0 - 40 U/L 17   ALT  0 - 41 U/L 15     2/16/24 10:55 AM    WBC  4.00 - 13.00 10^3/uL 5.93   RBC  3.80 - 5.80 10^6/uL 4.64   Hemoglobin  13.0 - 17.0 g/dL 13.8   Hematocrit  37.0 - 53.0 % 42.1   MCV  80.0 - 99.0 fL 90.7   MCH  27.0 - 33.2 pg 29.7   MCHC  31.0 - 37.0 g/dL 32.8    Platelet Count  150 - 450 10^3/uL 221   RDW  11.5 - 16.0 % 16.0   MPV  7.0 - 11.5 fL 9.1   Resulting Agency [facility]     Specimen Collected: 02/16/24 10:55 AM       ASSESSMENT/PLAN:     1.  Severe RF and CCP positive rheumatoid arthritis, starting in the Spring of 2022.   Stable,   - Much improved on Enbrel 50 mg once weekly, 15 mg of methotrexate weekly, and leflunomide 10 mg daily.  He will go for monitoring and inflammation labs today.    He is decreasing the methtorexate to 15mg aw [place]- he's overall better on this reduced dose for 6 months     Check labs every 3-4 months.   Rtc  in 4-6 months.  In the past he was on rinoq -  Filled out tax paperwork for his disability  in 12/2023     2.  Bladder neoplasm, receiving BCG treatments for 6 weeks. Enbrel is OK with his oncologist.    3.  Obstructive sleep apnea, using CPAP machine.      4.  Continued cigarette smoking.  Down to minimum nicotine vape pen.    5.  Peeling skin over his flexor wrists.  Cause unclear.- triamcinalont cream sent= consider if realted to enbrel - but not bad enough to change biologic     6. 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     7. Left shoudler tendonitis -     Summary:  Cont. Methotrexate 6 tablets a week for now.   Cont. Folic acid 1mg a day   Cont. Leflunomide 10mg a day   Cont. Enbrel 50mg a week   Cont. Turmeric,   Check labs every 3-4  months   Return to clinic in 6 months   Triamcinalone creatm 0.1 % topical for hands as needed.     Ivonne Gifford MD  6/19/2024   12:33 PM     is applicable because the patient's medical record notes reflects the ongoing nature of the continuous longitudinal relationship of care, and the medical record indicates that there is ongoing treatment of a serious/complex medical condition.

## 2024-06-19 NOTE — PATIENT INSTRUCTIONS
Cont. Methotrexate 6 tablets a week for now.   Cont. Folic acid 1mg a day   Cont. Leflunomide 10mg a day   Cont. Enbrel 50mg a week   Cont. Turmeric,   Check labs every 3-4  months   Return to clinic in 6 months   Triamcinalone creatm 0.1 % topical for hands as needed. -

## 2024-07-08 RX ORDER — LEFLUNOMIDE 10 MG/1
TABLET ORAL
Qty: 90 TABLET | Refills: 1 | Status: SHIPPED | OUTPATIENT
Start: 2024-07-08

## 2024-07-08 NOTE — TELEPHONE ENCOUNTER
Requested Prescriptions     Pending Prescriptions Disp Refills    LEFLUNOMIDE 10 MG Oral Tab [Pharmacy Med Name: Leflunomide 10 MG Oral Tablet] 90 tablet 0     Sig: Take 1 tablet by mouth once daily     LOV: 6/19/24  Future Appointments   Date Time Provider Department Center   10/31/2024 10:20 AM Palaniswami, Purani, MD [facility] [place]     Labs:   Component      Latest Ref Rng 6/19/2024   WBC      4.0 - 11.0 x10(3) uL 5.5    RBC      3.80 - 5.80 x10(6)uL 4.61    Hemoglobin      13.0 - 17.5 g/dL 13.9    Hematocrit      39.0 - 53.0 % 41.5    MCV      80.0 - 100.0 fL 90.0    MCH      26.0 - 34.0 pg 30.2    MCHC      31.0 - 37.0 g/dL 33.5    RDW      11.0 - 15.0 % 15.8 (H)    RDW-SD      35.1 - 46.3 fL 51.6 (H)    Platelet Count      150.0 - 450.0 10(3)uL 240.0    CREATININE      0.70 - 1.30 mg/dL 1.02    EGFR      >=60 mL/min/1.73m2 79    SED RATE      0 - 20 mm/Hr 19    C-REACTIVE PROTEIN      <1.00 mg/dL 1.00 (H)    AST (SGOT)      <=34 U/L 16    ALT (SGPT)      10 - 49 U/L 20       Legend:  (H) High    Summary:  Cont. Methotrexate 6 tablets a week for now.   Cont. Folic acid 1mg a day   Cont. Leflunomide 10mg a day   Cont. Enbrel 50mg a week   Cont. Turmeric,   Check labs every 3-4  months   Return to clinic in 6 months   Triamcinalone creatm 0.1 % topical for hands as needed.      Ivonne Gifford MD  6/19/2024   12:33 PM   Unable to reach for COVID-19 BRIAN call. Episode to be closed. This note will not be viewable in [address]

## 2024-11-11 NOTE — TELEPHONE ENCOUNTER
Requested Prescriptions     Pending Prescriptions Disp Refills    TRIAMCINOLONE 0.1 % External Cream [Pharmacy Med Name: Triamcinolone Acetonide 0.1 % External Cream] 45 g 0     Sig: APPLY  CREAM EXTERNALLY TWICE DAILY     Future Appointments   Date Time Provider Department Center   1/8/2025  8:40 AM Ivonne Gifford MD [facility]      LOV: 6/19/24   Last Refilled:8/3/23 45G 3RF   Labs:  Component      Latest Ref Rng 6/19/2024   WBC      4.0 - 11.0 x10(3) uL 5.5    RBC      3.80 - 5.80 x10(6)uL 4.61    Hemoglobin      13.0 - 17.5 g/dL 13.9    Hematocrit      39.0 - 53.0 % 41.5    MCV      80.0 - 100.0 fL 90.0    MCH      26.0 - 34.0 pg 30.2    MCHC      31.0 - 37.0 g/dL 33.5    RDW      11.0 - 15.0 % 15.8 (H)    RDW-SD      35.1 - 46.3 fL 51.6 (H)    Platelet Count      150.0 - 450.0 10(3)uL 240.0    CREATININE      0.70 - 1.30 mg/dL 1.02    EGFR      >=60 mL/min/1.73m2 79    SED RATE      0 - 20 mm/Hr 19    C-REACTIVE PROTEIN      <1.00 mg/dL 1.00 (H)    AST (SGOT)      <=34 U/L 16    ALT (SGPT)      10 - 49 U/L 20       Legend:  (H) High    Summary:  Cont. Methotrexate 6 tablets a week for now.   Cont. Folic acid 1mg a day   Cont. Leflunomide 10mg a day   Cont. Enbrel 50mg a week   Cont. Turmeric,   Check labs every 3-4  months   Return to clinic in 6 months   Triamcinalone creatm 0.1 % topical for hands as needed.      Ivonne Gifford MD  6/19/2024   12:33 PM

## 2024-11-12 RX ORDER — TRIAMCINOLONE ACETONIDE 1 MG/G
1 CREAM TOPICAL 2 TIMES DAILY
Qty: 45 G | Refills: 0 | Status: SHIPPED | OUTPATIENT
Start: 2024-11-12

## 2024-11-16 NOTE — TELEPHONE ENCOUNTER
LOV: 6/19/24  Future Appointments   Date Time Provider Department Center   1/8/2025  8:40 AM Ivonne Gifford MD [facility]     Summary:  Cont. Methotrexate 6 tablets a week for now.   Cont. Folic acid 1mg a day   Cont. Leflunomide 10mg a day   Cont. Enbrel 50mg a week   Cont. Turmeric,   Check labs every 3-4  months   Return to clinic in 6 months   Triamcinalone creatm 0.1 % topical for hands as needed.      Ivonne Gifford MD  6/19/2024   12:33 PM

## 2024-11-17 RX ORDER — MEDROXYPROGESTERONE ACETATE 150 MG/ML
INJECTION, SUSPENSION INTRAMUSCULAR
Qty: 4 ML | Refills: 5 | Status: SHIPPED | OUTPATIENT
Start: 2024-11-17

## 2024-12-03 NOTE — TELEPHONE ENCOUNTER
Current Outpatient Medications   Medication Sig Dispense Refill    methotrexate 2.5 MG Oral Tab Take 6 tablets (15 mg total) by mouth once a week. 78 tablet 1

## 2024-12-03 NOTE — TELEPHONE ENCOUNTER
LOV: 6/19/24  Future Appointments   Date Time Provider Department Center   12/26/2024  1:50 PM Ivonne Gifford MD [facility] [place]     Labs: AST 16 ALT 20 6/19/24  Summary:  Cont. Methotrexate 6 tablets a week for now.   Cont. Folic acid 1mg a day   Cont. Leflunomide 10mg a day   Cont. Enbrel 50mg a week   Cont. Turmeric,   Check labs every 3-4  months   Return to clinic in 6 months   Triamcinalone creatm 0.1 % topical for hands as needed.      Ivonne Gifford MD  6/19/2024   12:33 PM

## 2024-12-04 RX ORDER — METHOTREXATE 2.5 MG/1
15 TABLET ORAL WEEKLY
Qty: 78 TABLET | Refills: 1 | Status: SHIPPED | OUTPATIENT
Start: 2024-12-04

## 2024-12-13 RX ORDER — TRIAMCINOLONE ACETONIDE 1 MG/G
1 CREAM TOPICAL 2 TIMES DAILY
Qty: 45 G | Refills: 0 | Status: SHIPPED | OUTPATIENT
Start: 2024-12-13

## 2024-12-13 NOTE — TELEPHONE ENCOUNTER
LOV: 6/19/24  Future Appointments   Date Time Provider Department Center   12/26/2024  1:50 PM Ivonne Gifford MD [facility] [place]       Summary:  Cont. Methotrexate 6 tablets a week for now.   Cont. Folic acid 1mg a day   Cont. Leflunomide 10mg a day   Cont. Enbrel 50mg a week   Cont. Turmeric,   Check labs every 3-4  months   Return to clinic in 6 months   Triamcinalone creatm 0.1 % topical for hands as needed.      Ivonne Gifford MD  6/19/2024   12:33 PM

## 2024-12-26 ENCOUNTER — OFFICE VISIT (OUTPATIENT)
Dept: RHEUMATOLOGY | Facility: CLINIC | Age: 69
End: 2024-12-26
Payer: COMMERCIAL

## 2024-12-26 ENCOUNTER — LAB ENCOUNTER (OUTPATIENT)
Dept: LAB | Age: 69
End: 2024-12-26
Attending: INTERNAL MEDICINE
Payer: MEDICARE

## 2024-12-26 VITALS
WEIGHT: 265 LBS | SYSTOLIC BLOOD PRESSURE: 118 MMHG | HEIGHT: 74 IN | HEART RATE: 70 BPM | BODY MASS INDEX: 34.01 KG/M2 | DIASTOLIC BLOOD PRESSURE: 90 MMHG

## 2024-12-26 DIAGNOSIS — Z51.81 THERAPEUTIC DRUG MONITORING: ICD-10-CM

## 2024-12-26 DIAGNOSIS — E55.9 VITAMIN D DEFICIENCY: ICD-10-CM

## 2024-12-26 DIAGNOSIS — M05.79 RHEUMATOID ARTHRITIS INVOLVING MULTIPLE SITES WITH POSITIVE RHEUMATOID FACTOR (HCC): Primary | ICD-10-CM

## 2024-12-26 LAB
ALBUMIN SERPL-MCNC: 4.4 G/DL (ref 3.2–4.8)
ALBUMIN/GLOB SERPL: 1.5 {RATIO} (ref 1–2)
ALP LIVER SERPL-CCNC: 79 U/L
ALT SERPL-CCNC: 25 U/L
ANION GAP SERPL CALC-SCNC: 5 MMOL/L (ref 0–18)
AST SERPL-CCNC: 13 U/L (ref ?–34)
BILIRUB SERPL-MCNC: 0.7 MG/DL (ref 0.2–1.1)
BUN BLD-MCNC: 18 MG/DL (ref 9–23)
BUN/CREAT SERPL: 17.1 (ref 10–20)
CALCIUM BLD-MCNC: 9.3 MG/DL (ref 8.7–10.4)
CHLORIDE SERPL-SCNC: 109 MMOL/L (ref 98–112)
CO2 SERPL-SCNC: 28 MMOL/L (ref 21–32)
CREAT BLD-MCNC: 1.05 MG/DL
CRP SERPL-MCNC: 1.1 MG/DL (ref ?–1)
DEPRECATED RDW RBC AUTO: 57.7 FL (ref 35.1–46.3)
EGFRCR SERPLBLD CKD-EPI 2021: 76 ML/MIN/1.73M2 (ref 60–?)
ERYTHROCYTE [DISTWIDTH] IN BLOOD BY AUTOMATED COUNT: 17.8 % (ref 11–15)
ERYTHROCYTE [SEDIMENTATION RATE] IN BLOOD: 18 MM/HR
FASTING STATUS PATIENT QL REPORTED: YES
GLOBULIN PLAS-MCNC: 2.9 G/DL (ref 2–3.5)
GLUCOSE BLD-MCNC: 92 MG/DL (ref 70–99)
HCT VFR BLD AUTO: 40.6 %
HGB BLD-MCNC: 13.6 G/DL
MCH RBC QN AUTO: 30 PG (ref 26–34)
MCHC RBC AUTO-ENTMCNC: 33.5 G/DL (ref 31–37)
MCV RBC AUTO: 89.4 FL
OSMOLALITY SERPL CALC.SUM OF ELEC: 296 MOSM/KG (ref 275–295)
PLATELET # BLD AUTO: 220 10(3)UL (ref 150–450)
POTASSIUM SERPL-SCNC: 4.4 MMOL/L (ref 3.5–5.1)
PROT SERPL-MCNC: 7.3 G/DL (ref 5.7–8.2)
RBC # BLD AUTO: 4.54 X10(6)UL
SODIUM SERPL-SCNC: 142 MMOL/L (ref 136–145)
VIT D+METAB SERPL-MCNC: 42.3 NG/ML (ref 30–100)
WBC # BLD AUTO: 6.5 X10(3) UL (ref 4–11)

## 2024-12-26 PROCEDURE — 80053 COMPREHEN METABOLIC PANEL: CPT | Performed by: INTERNAL MEDICINE

## 2024-12-26 PROCEDURE — 99214 OFFICE O/P EST MOD 30 MIN: CPT | Performed by: INTERNAL MEDICINE

## 2024-12-26 PROCEDURE — 36415 COLL VENOUS BLD VENIPUNCTURE: CPT | Performed by: INTERNAL MEDICINE

## 2024-12-26 PROCEDURE — 82306 VITAMIN D 25 HYDROXY: CPT | Performed by: INTERNAL MEDICINE

## 2024-12-26 PROCEDURE — 86140 C-REACTIVE PROTEIN: CPT | Performed by: INTERNAL MEDICINE

## 2024-12-26 PROCEDURE — 85652 RBC SED RATE AUTOMATED: CPT | Performed by: INTERNAL MEDICINE

## 2024-12-26 PROCEDURE — 85027 COMPLETE CBC AUTOMATED: CPT | Performed by: INTERNAL MEDICINE

## 2024-12-26 NOTE — PROGRESS NOTES
Allen Verma Jr. is a 69 year old male.    HPI:     Chief Complaint   Patient presents with    Rheumatoid Arthritis    Joint Pain    Muscle Pain            I had the pleasure of seeing Allen Verma Jr. on 8/3/2023 for follow up. He is re-establishing care after Dr. Villaseñor retired.   He was last seen by him on 4/26/2023 .    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 CCP antibody is greater than 340.    Overall he ahs been stable.   His RA was uncontrolled early 2022 on methotrexate 25 mg weekly alone, so he has been injecting Enbrel 50 mg SureClick once a week, since 3/23/2022.  He dropped his methotrexate to 20 mg weekly.     His arthritis continues to improve after starting   leflunomide 10 mg daily. In January of 2023.  He is concerned, because there has been peeling of his skin, especially over his flexor wrists.  There is not a rash.  He is using a cream.    He has a hx of bladder cancer in 2022.    He finished his treatments for bladder neoplasm.      Today on 8/3/2023   He is tapering his methotrexate to 15m ga week. He has about 2/10 pain.   He was first 285 - and lost about 25 lbs and walking daily.   He is on enbrel 50mg a week and leflunomide 10mg a day.   He's had side effects prednisone - had bad anxiety and depression with it.   In the past he was on prednisone with his bell's palsy. He had bad mood changes.   He has been off predniosne.     He has hx of bladder cancer.     He's actively trying to cut out sweets and he has lost weight and he feels better with this joints.     12/7/2023   RA dx around 6/2021 - with dr. Butler.   Some days are good and some days are bad.   He takes enbrel on Friday and then ok by Wednesday but then he feels a little more joint stiffness coming back. Thursdays'  he takes methotrexate.   He has days wehre he can't walk.   He has days where he is ok.   He has a disabled license that he showed me. He states   Kasi filled out his disability paperwork -    6/19/2024  He's doing ok. He is on the enbrel 50mg a week , methotrexate 8 tablets a week and leflunomide 10mg ad ay.   He tried to wean hi meds but he skipped the enbrel one week and methtorexate and by tthe mid week he had more pain.     12/26/2024  He's doing  well on methotrexate - he's still on 6 tablets a week and leflunomide 10mg a day and aneberl 50mg a week   He is doing well on methotrexate 6 tablets aw [place] - no change from going down 8 tablets a week - he went down about 6 months ago.   He's not as good in the cold weather -   No bad infections or flare.s       HISTORY:  Past Medical History:    Bladder tumor    Bladder Cancer, non-invasive.    Bleeding hemorrhoid    Constipation    Essential hypertension    History of renal stone    Obesity, unspecified    Rheumatoid arthritis with negative rheumatoid factor (HCC)    SLEEP APNEA    AHI 23 REM 45, AutoPAP 4-20      Social Hx Reviewed   Family Hx Reviewed     Medications (Active prior to today's visit):  Current Outpatient Medications   Medication Sig Dispense Refill    TRIAMCINOLONE 0.1 % External Cream APPLY  CREAM EXTERNALLY TWICE DAILY 45 g 0    methotrexate 2.5 MG Oral Tab Take 6 tablets (15 mg total) by mouth once a week. 78 tablet 1    ENBREL SURECLICK 50 MG/ML Subcutaneous Solution Auto-injector INJECT 50MG (1ML) SUBCUTANEOUSLY EVERY 7 DAYS 4 mL 5    leflunomide 10 MG Oral Tab Take 1 tablet by mouth once daily 90 tablet 1    folic acid 1 MG Oral Tab Take 1 tablet (1 mg total) by mouth daily. 90 tablet 3    amLODIPine 5 MG Oral Tab Take 1 tablet (5 mg total) by mouth daily.      losartan 100 MG Oral Tab Take 1 tablet (100 mg total) by mouth daily.      Specialty Vitamins Products (PROSTATE OR) Take by mouth daily.      diazePAM 5 MG Oral Tab Take 1 tablet (5 mg total) by mouth. (Patient not taking: Reported on 12/7/2023)      Turmeric (QC TUMERIC COMPLEX OR) Take 1 tablet by mouth daily.        .cmed  Allergies:  Allergies   Allergen Reactions    Pcn [Penicillins] SWELLING     Throat swells up         ROS:   All other ROS are negative.     PHYSICAL EXAM:   HEENT: Clear oropharynx, no oral ulcers, EOM intact, clear sclear, PERRLA, pleasant, no acute distress, no CAD, no neck tendnerness, good ROM,   No rashes  CVS: RRR, no murmurs  RS: CTAB, no crackles, no rhonchi  ABD: Soft Non tender, no HSM felt, BS positive  Joint exam:    strength is good bilaterally.  Possibly active synovitis across his wrists and the MCP joints of his hands.   strength is good.  Mild right elbow tender.   Left knee mild  swelling, not tender   Left ankle not tender   B/l ankles +1 swleling   Left shoulder  not tender but intact abduction.     Component      Latest Ref [facility] 4/26/2023   WBC      4.0 - 11.0 x10(3) uL 6.5    RBC      3.80 - 5.80 x10(6)uL 4.62    Hemoglobin      13.0 - 17.5 g/dL 13.6    Hematocrit      39.0 - 53.0 % 42.2    MCV      80.0 - 100.0 fL 91.3    MCH      26.0 - 34.0 pg 29.4    MCHC      31.0 - 37.0 g/dL 32.2    RDW-SD      35.1 - 46.3 fL 53.0 (H)    RDW      11.0 - 15.0 % 15.9 (H)    Platelet Count      150.0 - 450.0 10(3)uL 239.0    Prelim Neutrophil Abs      1.50 - 7.70 x10 (3) uL 3.27    Neutrophils Absolute      1.50 - 7.70 x10(3) uL 3.27    Lymphocytes Absolute      1.00 - 4.00 x10(3) uL 2.30    Monocytes Absolute      0.10 - 1.00 x10(3) uL 0.69    Eosinophils Absolute      0.00 - 0.70 x10(3) uL 0.16    Basophils Absolute      0.00 - 0.20 x10(3) uL 0.04    Immature Granulocyte Absolute      0.00 - 1.00 x10(3) uL 0.01    Neutrophils %      % 50.5    Lymphocytes %      % 35.5    Monocytes %      % 10.7    Eosinophils %      % 2.5    Basophils %      % 0.6    Immature Granulocyte %      % 0.2    CREATININE      0.70 - 1.30 mg/dL 1.21    eGFR-Cr      >=60 mL/min/1.73m2 65    SED RATE      0 - 20 mm/Hr 9    C-REACTIVE PROTEIN      <0.30 mg/dL 0.58 (H)    AST (SGOT)      15 - 37 U/L 21    Albumin       3.4 - 5.0 g/dL 3.5       Component      Latest Ref Rn 12/7/2023   WBC      4.0 - 11.0 x10(3) uL 6.3    RBC      3.80 - 5.80 x10(6)uL 4.71    Hemoglobin      13.0 - 17.5 g/dL 14.2    Hematocrit      39.0 - 53.0 % 43.0    MCV      80.0 - 100.0 fL 91.3    MCH      26.0 - 34.0 pg 30.1    MCHC      31.0 - 37.0 g/dL 33.0    RDW      11.0 - 15.0 % 16.2 (H)    RDW-SD      35.1 - 46.3 fL 54.4 (H)    Platelet Count      150.0 - 450.0 10(3)uL 228.0    Quantiferon TB NIL      IU/mL 0.04    Quantiferon-TB1 Minus NIL      IU/mL 0.03    Quantiferon-TB2 Minus NIL      IU/mL 0.01    Quantiferon TB Mitogen minus NIL      IU/mL >10.00    Quantiferon TB Result      Negative  Negative    CREATININE      0.70 - 1.30 mg/dL 1.03    EGFR      >=60 mL/min/1.73m2 78    SED RATE      0 - 20 mm/Hr 19    C-REACTIVE PROTEIN      <1.00 mg/dL 0.50    AST (SGOT)      <=34 U/L 15    ALT (SGPT)      10 - 49 U/L 16       2/16/24 10:55 AM    Patient Fasting? Yes   Glucose  74 - 109 mg/dL 85   Blood Urea Nitrogen  6.0 - 20.0 mg/dL 17.0   Creatinine  0.70 - 1.20 mg/dL 0.91   BUN/CREAT Ratio  10.0 - 20.0 19.0   Sodium  136 - 145 mmol/L 141   Potassium  3.5 - 5.2 mmol/L 4.6   Comment: Specimen slightly hemolyzed.   Chloride  98 - 107 mmol/L 107   Carbon Dioxide  22.0 - 29.0 mmol/L 26.6   Calcium  8.6 - 10.3 mg/dL 8.5 Low    Corrected Calcium  8.3 - 10.3 mg/dL 8.6   Comment: Corrected Calcium Formula: ((4.0 - Albumin) x 0.8) + Calcium)  Note: Calculation is only valid when Albumin is less than 4.0 g/dL  The above calculated calcium value corrects for a low albumin level.  Correlation with the patient's clinical status is recommended to determine if this calculated value better represents the bioavailability of calcium. This calculation may not be accurate in all clinical situations.  Ionized calcium may provide a more accurate assessment of the patient's functional calcium level and requires an additional draw.   Total Protein  6.4 - 8.3 g/dL 6.3 Low     Albumin  3.5 - 5.2 g/dL 3.9   Bilirubin, Total  0.00 - 1.20 mg/dL 0.38   Alkaline Phosphatase  45 - 117 U/L 74   AST  0 - 40 U/L 17   ALT  0 - 41 U/L 15     2/16/24 10:55 AM    WBC  4.00 - 13.00 10^3/uL 5.93   RBC  3.80 - 5.80 10^6/uL 4.64   Hemoglobin  13.0 - 17.0 g/dL 13.8   Hematocrit  37.0 - 53.0 % 42.1   MCV  80.0 - 99.0 fL 90.7   MCH  27.0 - 33.2 pg 29.7   MCHC  31.0 - 37.0 g/dL 32.8   Platelet Count  150 - 450 10^3/uL 221   RDW  11.5 - 16.0 % 16.0   MPV  7.0 - 11.5 fL 9.1   Resulting Agency [facility]     Specimen Collected: 02/16/24 10:55 AM       ASSESSMENT/PLAN:     1.  Severe RF and CCP positive rheumatoid arthritis, starting in the Spring of 2022.   Stable,   - Much improved on Enbrel 50 mg once weekly, 15 mg of methotrexate weekly, and leflunomide 10 mg daily.  He will go for monitoring and inflammation labs today.     he's overall better on this reduced dose for 6 months   Can plan to taper the methtorexate to 5 tablets a week     Check labs every 3-6  months.   Rtc  in 4-6 months.  In the past he was on rinoq -  Filled out tax paperwork for his disability  in 12/2023     2.  Bladder neoplasm, receiving BCG treatments for 6 weeks. Enbrel is OK with his oncologist.    3.  Obstructive sleep apnea, using CPAP machine.      4.  Continued cigarette smoking.  Down to minimum nicotine vape pen.    5.  Peeling skin over his flexor wrists.  Cause unclear.- triamcinalont cream sent= consider if realted to enbrel - but not bad enough to change biologic     6. 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     7. Left shoudler tendonitis -     Summary:  Cont. Methotrexate 6 tablets a week for now.   Cont. Folic acid 1mg a day   Cont. Leflunomide 10mg a day   Cont. Enbrel 50mg a week   Cont. Turmeric,   Check labs today -   Return to clinic in 6 months   Triamcinalone creatm 0.1 % topical for hands as needed.      Ivonne Gifford MD  12/26/2024   2:22 PM         is applicable because the patient's medical record notes reflects the ongoing nature of the continuous longitudinal relationship of care, and the medical record indicates that there is ongoing treatment of a serious/complex medical condition.

## 2024-12-26 NOTE — PATIENT INSTRUCTIONS
Cont. Methotrexate 6 tablets a week for now. -   Cont. Folic acid 1mg a day   Cont. Leflunomide 10mg a day   Cont. Enbrel 50mg a week   Cont. Turmeric,   Check labs today -   Return to clinic in 6 months

## 2024-12-27 NOTE — TELEPHONE ENCOUNTER
LOV: 12/26/24  Future Appointments   Date Time Provider Department Center   6/26/2025 11:40 AM Ivonne Gifford MD [facility]     Labs: AST 13 ALT 25 12/26/24  Summary:  Cont. Methotrexate 6 tablets a week for now.   Cont. Folic acid 1mg a day   Cont. Leflunomide 10mg a day   Cont. Enbrel 50mg a week   Cont. Turmeric,   Check labs today -   Return to clinic in 6 months   Triamcinalone creatm 0.1 % topical for hands as needed.      Ivonne Gifford MD  12/26/2024   2:22 PM

## 2024-12-29 RX ORDER — FOLIC ACID 1 MG/1
1 TABLET ORAL DAILY
Qty: 90 TABLET | Refills: 3 | Status: SHIPPED | OUTPATIENT
Start: 2024-12-29

## 2024-12-29 RX ORDER — LEFLUNOMIDE 10 MG/1
TABLET ORAL
Qty: 90 TABLET | Refills: 1 | Status: SHIPPED | OUTPATIENT
Start: 2024-12-29

## 2025-01-16 RX ORDER — TRIAMCINOLONE ACETONIDE 1 MG/G
1 CREAM TOPICAL 2 TIMES DAILY
Qty: 45 G | Refills: 3 | Status: SHIPPED | OUTPATIENT
Start: 2025-01-16

## 2025-01-16 NOTE — TELEPHONE ENCOUNTER
LOV: 12/26/24  Future Appointments   Date Time Provider Department Center   6/26/2025 11:40 AM Ivonne Gifford MD [facility] [place]     Summary:  Cont. Methotrexate 6 tablets a week for now.   Cont. Folic acid 1mg a day   Cont. Leflunomide 10mg a day   Cont. Enbrel 50mg a week   Cont. Turmeric,   Check labs today -   Return to clinic in 6 months   Triamcinalone creatm 0.1 % topical for hands as needed.      Ivonne Gifford MD  12/26/2024   2:22 PM

## 2025-05-13 RX ORDER — MEDROXYPROGESTERONE ACETATE 150 MG/ML
INJECTION, SUSPENSION INTRAMUSCULAR
Qty: 4 ML | Refills: 11 | Status: SHIPPED | OUTPATIENT
Start: 2025-05-13

## 2025-05-13 NOTE — TELEPHONE ENCOUNTER
Requested Prescriptions     Pending Prescriptions Disp Refills    ENBREL SURECLICK 50 MG/ML Subcutaneous Solution Auto-injector [Pharmacy Med Name: Enbrel SureClick 50 MG/ML Subcutaneous Solution Auto-injector] 4 mL 0     Sig: INJECT 1 ML SUBCUTANEOUSLY ONCE A WEEK     LOV: 12/26/24  Future Appointments   Date Time Provider Department Center   6/26/2025 11:40 AM [facility]     Labs:   Component      Latest Ref Rng 12/26/2024   Glucose      70 - 99 mg/dL 92    Sodium      136 - 145 mmol/L 142    Potassium      3.5 - 5.1 mmol/L 4.4    Chloride      98 - 112 mmol/L 109    Carbon Dioxide, Total      21.0 - 32.0 mmol/L 28.0    ANION GAP      0 - 18 mmol/L 5    BUN      9 - 23 mg/dL 18    CREATININE      0.70 - 1.30 mg/dL 1.05    BUN/CREATININE RATIO      10.0 - 20.0  17.1    CALCIUM      8.7 - 10.4 mg/dL 9.3    CALCULATED OSMOLALITY      275 - 295 mOsm/kg 296 (H)    EGFR      >=60 mL/min/1.73m2 76    ALT (SGPT)      10 - 49 U/L 25    AST (SGOT)      <34 U/L 13    ALKALINE PHOSPHATASE      45 - 117 U/L 79    Total Bilirubin      0.2 - 1.1 mg/dL 0.7    PROTEIN, TOTAL      5.7 - 8.2 g/dL 7.3    Albumin      3.2 - 4.8 g/dL 4.4    Globulin      2.0 - 3.5 g/dL 2.9    A/G Ratio      1.0 - 2.0  1.5    Patient Fasting for CMP? Yes    WBC      4.0 - 11.0 x10(3) uL 6.5    RBC      3.80 - 5.80 x10(6)uL 4.54    Hemoglobin      13.0 - 17.5 g/dL 13.6    Hematocrit      39.0 - 53.0 % 40.6    MCV      80.0 - 100.0 fL 89.4    MCH      26.0 - 34.0 pg 30.0    MCHC      31.0 - 37.0 g/dL 33.5    RDW      11.0 - 15.0 % 17.8 (H)    RDW-SD      35.1 - 46.3 fL 57.7 (H)    Platelet Count      150.0 - 450.0 10(3)uL 220.0    SED RATE      0 - 20 mm/Hr 18    C-REACTIVE PROTEIN      <1.00 mg/dL 1.10 (H)    VITAMIN D, 25-OH, TOTAL      30.0 - 100.0 ng/mL 42.3       Legend:  (H) High  Summary:  Cont. Methotrexate 6 tablets a week for now.   Cont. Folic acid 1mg a day   Cont. Leflunomide 10mg a day   Cont. Enbrel 50mg a  week   Cont. Turmeric,   Check labs today -   Return to clinic in 6 months   Triamcinalone creatm 0.1 % topical for hands as needed.      Ivonne Gifford MD  12/26/2024   2:22 PM

## 2025-05-14 DIAGNOSIS — Z51.81 THERAPEUTIC DRUG MONITORING: ICD-10-CM

## 2025-05-14 DIAGNOSIS — M05.79 RHEUMATOID ARTHRITIS INVOLVING MULTIPLE SITES WITH POSITIVE RHEUMATOID FACTOR (HCC): Primary | ICD-10-CM

## 2025-05-15 RX ORDER — METHOTREXATE 2.5 MG/1
15 TABLET ORAL WEEKLY
Qty: 78 TABLET | Refills: 0 | Status: SHIPPED | OUTPATIENT
Start: 2025-05-15

## 2025-06-02 NOTE — H&P
Patient Name: Allen Verma Jr.    MRN: [id number]    : 1955       PRE-PROCEDURE HISTORY AND PHYSICAL        HPI: Allen Verma Jr. is a 69 year old male with a history of tobacco use, YASIR, emphysema, hypertension, bladder cancer, rheumatoid arthritis, and obesity who is normally followed by Dr. England in the pulmonary clinic of [facility]. The patient was noted to have a left upper lobe mass as well as hilar and mediastinal adenopathy on CT scan done 25. A PET scan was done 25 and showed the left upper lobe mass is hypermetabolic (SUV 17.4), and there is hypermetabolic left hilar and AP window adenopathy. The patient was referred to me for bronchoscopic biopsy.       PAST MEDICAL HISTORY      Past Medical History:    Asthma (HCC)    Bladder cancer (HCC)    Bleeding hemorrhoid    HTN (hypertension)    Obesity, unspecified    YASIR (obstructive sleep apnea)    AHI 23 REM 45, AutoPAP 4-20    Renal stone    Rheumatoid arthritis with negative rheumatoid factor (HCC)       PAST SURGICAL HISTORY      Past Surgical History[1]    MEDICATIONS      Prescriptions Prior to Admission[2]    ALLERGIES      Allergies[3]    SOCIAL HISTORY      Social Hx on file[4]    FAMILY HISTORY      Family History[5]    PHYSICAL EXAM      Vitals:    25 0626   BP: (!) 137/91   Pulse: 64   Resp: 16   Temp: 97.4 °F (36.3 °C)       Gen - Alert, oriented x 3, in no apparent distress  HEENT - head normocephalic, atraumatic. Eyes- no scleral icterus or injection  Lungs - equal breath sounds bilaterally. No wheezing, crackles, rhonchi  CV - regular rate & rhythm. Normal S1, S2. No murmurs, rubs, or gallops appreciated.  Extremities - No cyanosis, clubbing, edema appreciated.      ASSESSMENT AND PLAN     Left upper lobe mass, adenopathy  -plan for bronchoscopic biopsy using Ion robot-assisted bronchoscopy, radial EBUS, 2D and 3D fluoroscopy  -plan for EBUS-TBNA biopsy of lymph nodes  -procedure to be performed under  general anesthesia.    -informed consent obtained, risks and benefits were explained and the patient agrees to proceed    Geronimo Mora M.D.  Pulmonary/Critical Care          [1]   Past Surgical History:  Procedure Laterality Date    Colonoscopy  2008    normal    Colonoscopy,biopsy N/A 7/22/2016    Procedure: COLONOSCOPY, POSSIBLE BIOPSY, POSSIBLE POLYPECTOMY 69386;  Surgeon: Peewee Gould MD;  [facility] [facility]    Other surgical history  12/28/2020    TURBT - Dr. Johnson    Other surgical history  04/26/2021    Cysto - Dr. Johnson    Other surgical history  01/27/2022    Cystoscopy Dr. Johnson   [2]   Medications Prior to Admission   Medication Sig Dispense Refill Last Dose/Taking    METHOTREXATE 2.5 MG Oral Tab TAKE 6 TABLETS BY MOUTH ONCE A WEEK 78 tablet 0 5/29/2025    triamcinolone 0.1 % External Cream Apply 1 Application topically 2 (two) times daily. 45 g 3 Taking    FOLIC ACID 1 MG Oral Tab Take 1 tablet by mouth once daily 90 tablet 3 Past Week    LEFLUNOMIDE 10 MG Oral Tab Take 1 tablet by mouth once daily 90 tablet 1 6/3/2025    amLODIPine 5 MG Oral Tab Take 1 tablet (5 mg total) by mouth in the morning.   6/3/2025    losartan 100 MG Oral Tab Take 1 tablet (100 mg total) by mouth in the morning.   6/3/2025    Specialty Vitamins Products (PROSTATE OR) Take by mouth in the morning.   Past Week    Etanercept (ENBREL SURECLICK) 50 MG/ML Subcutaneous Solution Auto-injector INJECT 1 ML SUBCUTANEOUSLY ONCE A WEEK 4 mL 11 5/30/2025    diazePAM 5 MG Oral Tab Take 1 tablet (5 mg total) by mouth. (Patient not taking: Reported on 12/7/2023)       Turmeric (QC TUMERIC COMPLEX OR) Take 1 tablet by mouth daily.      [3]   Allergies  Allergen Reactions    Pcn [Penicillins] SWELLING     Throat swells up   [4]   Social History  Socioeconomic History    Marital status:     Number of children: 2   Occupational History    Occupation: Retired .   Tobacco Use    Smoking status: Every  Day     Current packs/day: 1.00     Average packs/day: 1 pack/day for 45.0 years (45.0 ttl pk-yrs)     Types: Cigarettes    Smokeless tobacco: Never    Tobacco comments:     Has cut back to 0.25 PPD   Vaping Use    Vaping status: Never Used   Substance and Sexual Activity    Alcohol use: Not Currently     Alcohol/week: 0.0 standard drinks of alcohol     Comment: no drinking since 8/2021    Drug use: No   [5]   Family History  Problem Relation Age of Onset    Hypertension Brother     Hypertension Mother     Other (Other) Mother         accidental    Diabetes Other     Heart Disorder Other     Cancer Neg

## 2025-06-03 ENCOUNTER — HOSPITAL ENCOUNTER (OUTPATIENT)
Dept: CT IMAGING | Facility: HOSPITAL | Age: 70
Discharge: HOME | End: 2025-06-03
Attending: INTERNAL MEDICINE
Payer: MEDICARE

## 2025-06-03 DIAGNOSIS — R91.1 LUNG NODULE: ICD-10-CM

## 2025-06-03 PROCEDURE — 71250 CT THORAX DX C-: CPT | Performed by: INTERNAL MEDICINE

## 2025-06-04 ENCOUNTER — ANESTHESIA (OUTPATIENT)
Dept: ENDOSCOPY | Facility: HOSPITAL | Age: 70
End: 2025-06-04
Payer: MEDICARE

## 2025-06-04 ENCOUNTER — APPOINTMENT (OUTPATIENT)
Dept: GENERAL RADIOLOGY | Facility: HOSPITAL | Age: 70
End: 2025-06-04
Attending: INTERNAL MEDICINE
Payer: MEDICARE

## 2025-06-04 ENCOUNTER — HOSPITAL ENCOUNTER (OUTPATIENT)
Facility: HOSPITAL | Age: 70
Setting detail: HOSPITAL OUTPATIENT SURGERY
Discharge: ANOTHER HOSPITAL | End: 2025-06-04
Attending: INTERNAL MEDICINE | Admitting: INTERNAL MEDICINE
Payer: MEDICARE

## 2025-06-04 ENCOUNTER — ANESTHESIA EVENT (OUTPATIENT)
Dept: ENDOSCOPY | Facility: HOSPITAL | Age: 70
End: 2025-06-04
Payer: MEDICARE

## 2025-06-04 VITALS
WEIGHT: 260 LBS | HEIGHT: 74 IN | DIASTOLIC BLOOD PRESSURE: 79 MMHG | SYSTOLIC BLOOD PRESSURE: 129 MMHG | BODY MASS INDEX: 33.37 KG/M2 | RESPIRATION RATE: 20 BRPM | TEMPERATURE: 98 F | OXYGEN SATURATION: 92 % | HEART RATE: 77 BPM

## 2025-06-04 LAB
BASOPHILS NFR BRONCH: 0 %
EOSINOPHIL NFR BRONCH: 0 %
LYMPHOCYTES NFR BRONCH: 7 %
MONOS+MACROS NFR BRONCH: 9 %
NEUTROPHILS NFR BRONCH: 84 %
TOTAL CELLS COUNTED FLD: 100

## 2025-06-04 PROCEDURE — 87206 SMEAR FLUORESCENT/ACID STAI: CPT | Performed by: INTERNAL MEDICINE

## 2025-06-04 PROCEDURE — 88333 PATH CONSLTJ SURG CYTO XM 1: CPT | Performed by: INTERNAL MEDICINE

## 2025-06-04 PROCEDURE — 88312 SPECIAL STAINS GROUP 1: CPT | Performed by: INTERNAL MEDICINE

## 2025-06-04 PROCEDURE — 87116 MYCOBACTERIA CULTURE: CPT | Performed by: INTERNAL MEDICINE

## 2025-06-04 PROCEDURE — 88341 IMHCHEM/IMCYTCHM EA ADD ANTB: CPT | Performed by: INTERNAL MEDICINE

## 2025-06-04 PROCEDURE — 88305 TISSUE EXAM BY PATHOLOGIST: CPT | Performed by: INTERNAL MEDICINE

## 2025-06-04 PROCEDURE — 89051 BODY FLUID CELL COUNT: CPT | Performed by: INTERNAL MEDICINE

## 2025-06-04 PROCEDURE — 88342 IMHCHEM/IMCYTCHM 1ST ANTB: CPT | Performed by: INTERNAL MEDICINE

## 2025-06-04 PROCEDURE — 87071 CULTURE AEROBIC QUANT OTHER: CPT | Performed by: INTERNAL MEDICINE

## 2025-06-04 PROCEDURE — 71045 X-RAY EXAM CHEST 1 VIEW: CPT | Performed by: INTERNAL MEDICINE

## 2025-06-04 PROCEDURE — 89050 BODY FLUID CELL COUNT: CPT | Performed by: INTERNAL MEDICINE

## 2025-06-04 PROCEDURE — 88104 CYTOPATH FL NONGYN SMEARS: CPT | Performed by: INTERNAL MEDICINE

## 2025-06-04 PROCEDURE — 87102 FUNGUS ISOLATION CULTURE: CPT | Performed by: INTERNAL MEDICINE

## 2025-06-04 PROCEDURE — 87205 SMEAR GRAM STAIN: CPT | Performed by: INTERNAL MEDICINE

## 2025-06-04 PROCEDURE — 88173 CYTOPATH EVAL FNA REPORT: CPT | Performed by: INTERNAL MEDICINE

## 2025-06-04 PROCEDURE — 88360 TUMOR IMMUNOHISTOCHEM/MANUAL: CPT | Performed by: INTERNAL MEDICINE

## 2025-06-04 RX ORDER — NALOXONE HYDROCHLORIDE 0.4 MG/ML
0.08 INJECTION, SOLUTION INTRAMUSCULAR; INTRAVENOUS; SUBCUTANEOUS AS NEEDED
Status: DISCONTINUED | OUTPATIENT
Start: 2025-06-04 | End: 2025-06-04 | Stop reason: HOSPADM

## 2025-06-04 RX ORDER — HYDROMORPHONE HYDROCHLORIDE 1 MG/ML
0.4 INJECTION, SOLUTION INTRAMUSCULAR; INTRAVENOUS; SUBCUTANEOUS EVERY 5 MIN PRN
Status: DISCONTINUED | OUTPATIENT
Start: 2025-06-04 | End: 2025-06-04 | Stop reason: HOSPADM

## 2025-06-04 RX ORDER — SODIUM CHLORIDE, SODIUM LACTATE, POTASSIUM CHLORIDE, CALCIUM CHLORIDE 600; 310; 30; 20 MG/100ML; MG/100ML; MG/100ML; MG/100ML
INJECTION, SOLUTION INTRAVENOUS CONTINUOUS
Status: DISCONTINUED | OUTPATIENT
Start: 2025-06-04 | End: 2025-06-04

## 2025-06-04 RX ORDER — SODIUM CHLORIDE, SODIUM LACTATE, POTASSIUM CHLORIDE, CALCIUM CHLORIDE 600; 310; 30; 20 MG/100ML; MG/100ML; MG/100ML; MG/100ML
INJECTION, SOLUTION INTRAVENOUS CONTINUOUS
Status: DISCONTINUED | OUTPATIENT
Start: 2025-06-04 | End: 2025-06-04 | Stop reason: HOSPADM

## 2025-06-04 RX ORDER — METOCLOPRAMIDE HYDROCHLORIDE 5 MG/ML
10 INJECTION INTRAMUSCULAR; INTRAVENOUS EVERY 8 HOURS PRN
Status: DISCONTINUED | OUTPATIENT
Start: 2025-06-04 | End: 2025-06-04 | Stop reason: HOSPADM

## 2025-06-04 RX ORDER — ONDANSETRON 2 MG/ML
4 INJECTION INTRAMUSCULAR; INTRAVENOUS EVERY 6 HOURS PRN
Status: DISCONTINUED | OUTPATIENT
Start: 2025-06-04 | End: 2025-06-04 | Stop reason: HOSPADM

## 2025-06-04 RX ORDER — ONDANSETRON 2 MG/ML
INJECTION INTRAMUSCULAR; INTRAVENOUS AS NEEDED
Status: DISCONTINUED | OUTPATIENT
Start: 2025-06-04 | End: 2025-06-04 | Stop reason: SURG

## 2025-06-04 RX ORDER — IPRATROPIUM BROMIDE AND ALBUTEROL SULFATE 2.5; .5 MG/3ML; MG/3ML
3 SOLUTION RESPIRATORY (INHALATION) ONCE
Status: COMPLETED | OUTPATIENT
Start: 2025-06-04 | End: 2025-06-04

## 2025-06-04 RX ORDER — DEXAMETHASONE SODIUM PHOSPHATE 4 MG/ML
VIAL (ML) INJECTION AS NEEDED
Status: DISCONTINUED | OUTPATIENT
Start: 2025-06-04 | End: 2025-06-04 | Stop reason: SURG

## 2025-06-04 RX ORDER — MEPERIDINE HYDROCHLORIDE 25 MG/ML
12.5 INJECTION INTRAMUSCULAR; INTRAVENOUS; SUBCUTANEOUS AS NEEDED
Status: DISCONTINUED | OUTPATIENT
Start: 2025-06-04 | End: 2025-06-04 | Stop reason: HOSPADM

## 2025-06-04 RX ORDER — HYDROMORPHONE HYDROCHLORIDE 1 MG/ML
0.6 INJECTION, SOLUTION INTRAMUSCULAR; INTRAVENOUS; SUBCUTANEOUS EVERY 5 MIN PRN
Status: DISCONTINUED | OUTPATIENT
Start: 2025-06-04 | End: 2025-06-04 | Stop reason: HOSPADM

## 2025-06-04 RX ORDER — LIDOCAINE HYDROCHLORIDE 10 MG/ML
INJECTION, SOLUTION EPIDURAL; INFILTRATION; INTRACAUDAL; PERINEURAL AS NEEDED
Status: DISCONTINUED | OUTPATIENT
Start: 2025-06-04 | End: 2025-06-04 | Stop reason: SURG

## 2025-06-04 RX ORDER — IPRATROPIUM BROMIDE AND ALBUTEROL SULFATE 2.5; .5 MG/3ML; MG/3ML
SOLUTION RESPIRATORY (INHALATION)
Status: COMPLETED
Start: 2025-06-04 | End: 2025-06-04

## 2025-06-04 RX ORDER — ROCURONIUM BROMIDE 10 MG/ML
INJECTION, SOLUTION INTRAVENOUS AS NEEDED
Status: DISCONTINUED | OUTPATIENT
Start: 2025-06-04 | End: 2025-06-04 | Stop reason: SURG

## 2025-06-04 RX ORDER — GLYCOPYRROLATE 0.2 MG/ML
INJECTION, SOLUTION INTRAMUSCULAR; INTRAVENOUS AS NEEDED
Status: DISCONTINUED | OUTPATIENT
Start: 2025-06-04 | End: 2025-06-04 | Stop reason: SURG

## 2025-06-04 RX ORDER — HYDROMORPHONE HYDROCHLORIDE 1 MG/ML
0.2 INJECTION, SOLUTION INTRAMUSCULAR; INTRAVENOUS; SUBCUTANEOUS EVERY 5 MIN PRN
Status: DISCONTINUED | OUTPATIENT
Start: 2025-06-04 | End: 2025-06-04 | Stop reason: HOSPADM

## 2025-06-04 RX ORDER — NEOSTIGMINE METHYLSULFATE 1 MG/ML
INJECTION INTRAVENOUS AS NEEDED
Status: DISCONTINUED | OUTPATIENT
Start: 2025-06-04 | End: 2025-06-04 | Stop reason: SURG

## 2025-06-04 RX ADMIN — DEXAMETHASONE SODIUM PHOSPHATE 4 MG: 4 MG/ML VIAL (ML) INJECTION at 07:32:00

## 2025-06-04 RX ADMIN — SODIUM CHLORIDE, SODIUM LACTATE, POTASSIUM CHLORIDE, CALCIUM CHLORIDE: 600; 310; 30; 20 INJECTION, SOLUTION INTRAVENOUS at 08:42:00

## 2025-06-04 RX ADMIN — ONDANSETRON 4 MG: 2 INJECTION INTRAMUSCULAR; INTRAVENOUS at 08:13:00

## 2025-06-04 RX ADMIN — ROCURONIUM BROMIDE 50 MG: 10 INJECTION, SOLUTION INTRAVENOUS at 07:14:00

## 2025-06-04 RX ADMIN — SODIUM CHLORIDE, SODIUM LACTATE, POTASSIUM CHLORIDE, CALCIUM CHLORIDE: 600; 310; 30; 20 INJECTION, SOLUTION INTRAVENOUS at 07:16:00

## 2025-06-04 RX ADMIN — LIDOCAINE HYDROCHLORIDE 50 MG: 10 INJECTION, SOLUTION EPIDURAL; INFILTRATION; INTRACAUDAL; PERINEURAL at 07:14:00

## 2025-06-04 RX ADMIN — ROCURONIUM BROMIDE 10 MG: 10 INJECTION, SOLUTION INTRAVENOUS at 07:53:00

## 2025-06-04 RX ADMIN — GLYCOPYRROLATE 0.6 MG: 0.2 INJECTION, SOLUTION INTRAMUSCULAR; INTRAVENOUS at 08:26:00

## 2025-06-04 RX ADMIN — NEOSTIGMINE METHYLSULFATE 3 MG: 1 INJECTION INTRAVENOUS at 08:26:00

## 2025-06-04 NOTE — DISCHARGE INSTRUCTIONS
Home Care Instructions Following Bronchoscopy / Endobronchial Ultrasound with Sedation    Diet:  Prior to your examination, a local anesthetic was used to numb the back of your throat. Do not eat or drink for two hours. Your nurse will instruct you with the time you may resume your diet.  Sip fluids initially and advance to your regular diet as tolerated.  Do not drink alcohol today.    Medication:  If you have questions about resuming your normal medications, please contact your Primary Care Physician.    Activities:  Do not drive today.  Do not operate any machinery today (including kitchen equipment).    What to Expect:  A sore throat  A cough  Hoarseness  A small amount of blood in your sputum    Contact Your Doctor If:  You have difficulty breathing  You have chest pain  You have a fever greater than 102°F  You cough up more than a few tablespoons of blood in your sputum    **If unable to reach your doctor, please go to the [facility] Emergency Room**    - Your referring physician will receive a full report of your examination.  - Please contact your physician’s office within one week for results if appointment not scheduled.

## 2025-06-04 NOTE — OPERATIVE REPORT
Patient Name: Allen Verma Jr.    MRN: [id number]    : 1955           Robotic Bronchoscopy Procedure Note - [facility]     Preop diagnosis:               Left upper lobe mass  Hilar and mediastinal adenopathy     Postop diagnosis:             Left upper lobe mass  Hilar and mediastinal adenopathy     Equipment used:      Standard Bronchoscopy  Robotic bronchoscopy  Fluoroscopy  Radial EBUS  Linear EBUS    Procedures performed:  TBNA biopsy, left upper lobe mass  Cryobiopsy,  left upper lobe mass   BAL, left upper lobe   EBUS-TBNA biopsy, station 7 node  EBUS-TBNA biopsy, station 4L node  EBUS-TBNA biopsy, station 10L node     Sedation used:        General Anesthesia     Description of procedure:      Preprocedure planning was done utilizing a pre-procedure CT scan in conjunction with the Ion planning software. A left upper lobe mass was selected as the primary target for biopsy. Informed consent was obtained from the patient. The patient was brought to the bronchoscopy lab. The patient was placed under general anesthesia and intubated by the anesthesiologist.      A standard bronchoscope was inserted via the endotracheal tube for a systematic airway inspection. The trachea appeared normal. The main jessica appeared normal. There were no purulent secretions noted. The right lung was inspected. The right mainstem appeared normal. The right upper lobe bronchus appeared normal. The right middle lobe bronchi appeared normal. The right lower lobe bronchi were inspected and appeared normal. There were no endobronchial lesions.  The left lung was then inspected. The left mainstem was normal. There was some irregular tissue protruding into the left upper lobe airways, suspicious for tumor. The lingula and left lower lobe bronchi were inspected and appeared normal.      Once the airway inspection was completed, the patient's ETT was connected with the magnetic swivel adapter to the arm of the Ion Robot. The  Ion catheter was introduced into the ETT adapter and advanced into the ETT and trachea. Registration was performed per Ion protocol. The catheter was advanced to the left upper lobe in approximation to the target lesion. The left upper lobe lesion was confirmed on fluoroscopy. A radial ebus probe was inserted and advanced to the lesion; a concentric lesion was confirmed on endobronchial ultrasound. A 21 gauge biopsy needle was inserted through the catheter working channel, and under fluoroscopic visualization, multiple transbronchial needle aspiration biopsies were performed from the left upper lobe mass.  Specimens were processed by the cytopathology technician. Next, an Erbe 1.1mm cryoprobe was inserted through the working channel and navigated to the lesion; multiple cryobiopsies were performed from the left upper lobe mass under fluoroscopic visualization. Specimens were processed by the cytopathology technician. After biopsies were performed, 20cc of sterile saline was injected into the ion catheter for bronchoalveolar lavage in the left upper lobe, and aspirated back.    Rapid on-site pathology review indicated atypical cells. Once the above biopsy procedures were completed, the Ion catheter was removed from the ETT.     A convex probe endobronchial ultrasound (EBUS) bronchoscope was inserted via the endotracheal tube.  Bilateral lymph node stations were systematically surveyed.  A dedicated 22-gauge EBUS-TBNA needle was used to sample target locations. Multiple passes were taken from each location for slide preparation and cell block preparation by the cytopathology technician. The following lymph node stations were sampled:    Station 7 lymph node  Station 4L lymph node  Station 10L lymph node    Rapid onsite evaluation by the pathologist revealed the presence of lymphocytes but no obvious malignancy. The airways were cleared of bloody debris. There was adequate hemostasis. The bronchoscope was withdrawn.  The care of the patient was transferred to the anesthesiologist for reversal of anesthesia and extubation. The patient tolerated the procedure well and was transferred to the recovery area. Blood loss was minimal. There were no apparent complications.     Samples obtained:   TBNA biopsy, left upper lobe mass   Cryobiopsy,  left upper lobe mass   BAL, left upper lobe   EBUS-TBNA biopsy, station 7 node  EBUS-TBNA biopsy, station 4L node  EBUS-TBNA biopsy, station 10L node    ASSESSMENT AND PLAN        There was some irregular tissue protruding into the left upper lobe on standard bronchoscopy.   Ion robot-assisted bronchoscopy, fluoroscopy, and radial EBUS was used to aid in biopsy of the left upper lobe mass.   Bronchoalveolar lavage was performed in the left upper lobe   EBUS-TBNA biopsies of station 7, 4L, and 10L nodes were performed.   A post procedure CXR was ordered.  Will follow up final cytology, surgical pathology, and microbiology results.      Geronimo Mora M.D.  Pulmonary/Critical Care

## 2025-06-04 NOTE — ANESTHESIA PROCEDURE NOTES
Airway  Date/Time: 6/4/2025 7:17 AM  Reason: elective    Airway not difficult    General Information and Staff   Patient location during procedure: OR  Anesthesiologist: Kvng Elmore MD  Performed: anesthesiologist   Performed by: Kvng Elmore MD  Authorized by: Kvng Elmore MD        Indications and Patient Condition  Indications for airway management: anesthesia  Sedation level: deep      Preoxygenated: yesPatient position: sniffing    Mask difficulty assessment: 1 - vent by mask  Planned trial extubation    Final Airway Details    Final airway type: endotracheal airway    Successful airway: ETT  Cuffed: yes   Successful intubation technique: Video laryngoscopy  Endotracheal tube insertion site: oral  Blade: GlideScope  Blade size: #4  ETT size (mm): 8.5    Cormack-Lehane Classification: grade IIA - partial view of glottis  Placement verified by: capnometry   Measured from: lips  ETT to lips (cm): 20  Number of attempts at approach: 1

## 2025-06-04 NOTE — ANESTHESIA PREPROCEDURE EVALUATION
PRE-OP EVALUATION    Patient Name: Allen Verma Jr.    Admit Diagnosis: ABNORMAL CT CHEST, ABNORMAL PET SCAN    Pre-op Diagnosis: ABNORMAL CT CHEST, ABNORMAL PET SCAN    ROBOT-ASSISTED NAVIGATIONAL ION  BRONCHOSCOPY AND ENDOBRONCHIAL ULTRASOUND WITH TRANSBRONCHIAL NEEDLE BIOPSY    Anesthesia Procedure: ROBOT-ASSISTED NAVIGATIONAL ION  BRONCHOSCOPY AND ENDOBRONCHIAL ULTRASOUND WITH TRANSBRONCHIAL NEEDLE BIOPSY  .    Surgeons and Role:     * Geronimo Mora MD - Primary    Pre-op vitals reviewed.  Temp: 97.4 °F (36.3 °C)  Pulse: 64  Resp: 16  BP: 137/91  SpO2: 97 %  Body mass index is 33.38 kg/m².    Current medications reviewed.  Hospital Medications:  Current Medications[1]    Outpatient Medications:   Prescriptions Prior to Admission[2]    Allergies: Pcn [penicillins]      Anesthesia Evaluation    Patient summary reviewed.    Anesthetic Complications           GI/Hepatic/Renal                                 Cardiovascular                (+) obesity  (+) hypertension                                     Endo/Other                           (+) arthritis       Pulmonary      (+) asthma              (+) sleep apnea       Neuro/Psych                                      Past Surgical History[3]  Social Hx on file[4]  History   Drug Use No     Available pre-op labs reviewed.               Airway      Mallampati: III  Mouth opening: <3 FB  TM distance: < 4 cm  Neck ROM: limited Cardiovascular    Cardiovascular exam normal.  Rhythm: regular  Rate: normal     Dental             Pulmonary    Pulmonary exam normal.                 Other findings              ASA: 3   Plan: general  NPO status verified and patient meets guidelines.          Plan/risks discussed with: patient and significant other                Present on Admission:  **None**             [1]    lactated ringers infusion   Intravenous Continuous    [COMPLETED] ipratropium-albuterol (Duoneb) 0.5-2.5 (3) MG/3ML inhalation solution 3 mL  3 mL Nebulization Once    [2]   Medications Prior to Admission   Medication Sig Dispense Refill Last Dose/Taking    METHOTREXATE 2.5 MG Oral Tab TAKE 6 TABLETS BY MOUTH ONCE A WEEK 78 tablet 0 5/29/2025    triamcinolone 0.1 % External Cream Apply 1 Application topically 2 (two) times daily. 45 g 3 Taking    FOLIC ACID 1 MG Oral Tab Take 1 tablet by mouth once daily 90 tablet 3 Past Week    LEFLUNOMIDE 10 MG Oral Tab Take 1 tablet by mouth once daily 90 tablet 1 6/3/2025    amLODIPine 5 MG Oral Tab Take 1 tablet (5 mg total) by mouth in the morning.   6/3/2025    losartan 100 MG Oral Tab Take 1 tablet (100 mg total) by mouth in the morning.   6/3/2025    Specialty Vitamins Products (PROSTATE OR) Take by mouth in the morning.   Past Week    Etanercept (ENBREL SURECLICK) 50 MG/ML Subcutaneous Solution Auto-injector INJECT 1 ML SUBCUTANEOUSLY ONCE A WEEK 4 mL 11 5/30/2025    diazePAM 5 MG Oral Tab Take 1 tablet (5 mg total) by mouth. (Patient not taking: Reported on 12/7/2023)       Turmeric (QC TUMERIC COMPLEX OR) Take 1 tablet by mouth daily.      [3]   Past Surgical History:  Procedure Laterality Date    Colonoscopy  2008    normal    Colonoscopy,biopsy N/A 7/22/2016    Procedure: COLONOSCOPY, POSSIBLE BIOPSY, POSSIBLE POLYPECTOMY 73691;  Surgeon: Peewee Gould MD;  Location: [facility]    Other surgical history  12/28/2020    TURBT - Dr. Johnson    Other surgical history  04/26/2021    Cysto - Dr. Johnson    Other surgical history  01/27/2022    Cystoscopy Dr. Johnson   [4]   Social History  Socioeconomic History    Marital status:     Number of children: 2   Occupational History    Occupation: Retired .   Tobacco Use    Smoking status: Every Day     Current packs/day: 1.00     Average packs/day: 1 pack/day for 45.0 years (45.0 ttl pk-yrs)     Types: Cigarettes    Smokeless tobacco: Never    Tobacco comments:     Has cut back to 0.25 PPD   Vaping Use    Vaping status: Never Used   Substance and Sexual  Activity    Alcohol use: Not Currently     Alcohol/week: 0.0 standard drinks of alcohol     Comment: no drinking since 8/2021    Drug use: No

## 2025-06-04 NOTE — ANESTHESIA POSTPROCEDURE EVALUATION
[facility]    Allen Verma Jr. Patient Status:  Hospital Outpatient Surgery   Age/Gender 69 year old male MRN [id number]   Location [facility] Attending Geronimo Mora MD   Hosp Day # 0 PCP No primary care provider on file.       Anesthesia Post-op Note    ROBOT-ASSISTED NAVIGATIONAL ION  BRONCHOSCOPY with transbronchial needle aspiration, cryoprobe biopsies, lavage  AND ENDOBRONCHIAL ULTRASOUND WITH TRANSBRONCHIAL NEEDLE BIOPSY    Procedure Summary       Date: 06/04/25 Room / Location:  ENDOSCOPY 04 /  ENDOSCOPY    Anesthesia Start: 0712 Anesthesia Stop: 0843    Procedures:       ROBOT-ASSISTED NAVIGATIONAL ION  BRONCHOSCOPY with transbronchial needle aspiration, cryoprobe biopsies, lavage  AND ENDOBRONCHIAL ULTRASOUND WITH TRANSBRONCHIAL NEEDLE BIOPSY      . Diagnosis: (ABNORMAL CT CHEST, ABNORMAL PET SCAN)    Surgeons: Geronimo Mora MD Anesthesiologist: Kvng Elmore MD    Anesthesia Type: general ASA Status: 3            Anesthesia Type: general    Vitals Value Taken Time   /91 06/04/25 08:43   Temp 97.1 °F (36.2 °C) 06/04/25 08:43   Pulse 96 06/04/25 08:43   Resp 16 06/04/25 08:43   SpO2 95 % 06/04/25 08:43           Patient Location: PACU    Anesthesia Type: general    Airway Patency: patent and extubated    Postop Pain Control: adequate    Mental Status: mildly sedated but able to meaningfully participate in the post-anesthesia evaluation    Nausea/Vomiting: none    Cardiopulmonary/Hydration status: stable euvolemic    Complications: no apparent anesthesia related complications    Postop vital signs: stable    Dental Exam: Unchanged from Preop    Patient to be discharged from PACU when criteria met.

## 2025-06-16 LAB
% TUMOR CELLS STAINING: 10 %
% TUMOR CELLS STAINING: 10 %

## 2025-07-09 ENCOUNTER — OFFICE VISIT (OUTPATIENT)
Dept: SURGERY | Facility: CLINIC | Age: 70
End: 2025-07-09
Payer: COMMERCIAL

## 2025-07-09 VITALS
OXYGEN SATURATION: 97 % | WEIGHT: 260 LBS | BODY MASS INDEX: 33 KG/M2 | SYSTOLIC BLOOD PRESSURE: 137 MMHG | RESPIRATION RATE: 16 BRPM | DIASTOLIC BLOOD PRESSURE: 92 MMHG | HEART RATE: 83 BPM | TEMPERATURE: 97 F

## 2025-07-09 DIAGNOSIS — C34.12 CANCER OF UPPER LOBE OF LEFT LUNG (HCC): Primary | ICD-10-CM

## 2025-07-09 NOTE — CONSULTS
Thoracic Surgery Consult Note     Name: Allen Verma Jr.   Age: 69 year old   Sex: male.   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 Verma is a 69 year old male current smoker presenting with a left upper lobe cancer.     Patient was getting lung cancer surveillance scans and CT from 4/21/25 showed an enlarging left upper lobe mass measuring 4.7x5.7cm with stable 3mm right upper lobe, cavitary 8mm left upper lobe nodule, and hilar and mediastinal lymphadenopathy. PET from 5/8/25 showed the left upper lobe mass had a max SUV of 17.4, hypermetabolic left hilar and AP window nodes, and cervical  nodes. Bronchoscopy by Dr. Mora with biopsy of the mass was positive for non small cell carcinoma with squamous features. Station 7, 4L and 10L lymph nodes were negative. Brain MRI negative. Patient was referred by Dr. Calle to discuss surgery.     Patient feels well. He is able to get around and go up two flights of stairs without dyspnea. denies any cough, chest pain, fevers, chills, weight loss, changes in vision, headache, or new bone pain.     PMH includes asthma, bladder cancer 2020, RA on methotrexate and enbrel. Patient had an abnormal stress test on 4/10/25 and subsequent cardiac cath was normal. No blood thinners. No prior thoracic surgeries. Patient has a family history of gastric cancer with his aunt.     Review Of Systems:   10 point review of systems was conducted and was negative except for the pertinent positives listed in the above HPI.    Past Medical History: Past Medical History[1]    Past Surgical History: Past Surgical History[2]    Social History:   Social History     Socioeconomic History    Marital status:      Spouse name: Not on file    Number of children: 2    Years of education: Not on file    Highest education  level: Not on file   Occupational History    Occupation: Retired .   Tobacco Use    Smoking status: Every Day     Current packs/day: 1.00     Average packs/day: 1 pack/day for 45.0 years (45.0 ttl pk-yrs)     Types: Cigarettes    Smokeless tobacco: Never    Tobacco comments:     Has cut back to 0.25 PPD   Vaping Use    Vaping status: Never Used   Substance and Sexual Activity    Alcohol use: Not Currently     Alcohol/week: 0.0 standard drinks of alcohol     Comment: no drinking since 8/2021    Drug use: No    Sexual activity: Not on file   Other Topics Concern    Not on file   Social History Narrative    Retired . . 2 children     Social Drivers of Health     Food Insecurity: Low Risk  (2/28/2022)    Received from [facility]    Food Insecurity     Have there been times that your food ran out, and you didn't have money to get more?: No     Are there times that you worry that this might happen?: No   Transportation Needs: Low Risk  (2/28/2022)    Received from [facility]    Transportation Needs     Do you have trouble getting transportation to medical appointments?: No     How do you normally get to and from your appointments?: Not on file   Stress: Not on file   Housing Stability: Low Risk  (2/28/2022)    Received from [facility]    Housing Stability     Are you concerned about having a safe and reliable place to live?: No       Family History: Family History[3]    Allergies:  Allergies[4]    Medications:   Medications - Current[5]  Current Medications[6]      Objective:      Vital Signs:  BP (!) 137/92 (BP Location: Left arm, Patient Position: Sitting, Cuff Size: large)   Pulse 83   Temp 97.3 °F (36.3 °C) (Temporal)   Resp 16   Wt 117.9 kg (260 lb)   SpO2 97%   BMI 33.38 kg/m²     Physical Exam:  General: Well appearing male in no acute distress  HEENT: Normocephalic, PERRL, EOMI, no scleral  icterus  Neck: Supple, trachea midline, no JVD, no masses. Thyroid not grossly enlarged  Nodes: No cervical or supraclavicular lymphadenopathy appreciated  Heart: Regular rate and rhythm. No murmurs, rubs or gallops. No lower extremity edema.  Lungs: Normal respiratory effort. Clear to ascultation bilaterally.   Abdomen: Soft, Non-tender, non-distended. No hepatosplenomegaly noted.  Extremities: No clubbing or cyanosis. No lateralizing weakness  Neuro: No gross cranial nerve defects, no loss of sensation  Psych: Oriented to person place and time, normal mood and affect      Labs:   Lab Results   Component Value Date/Time    WBC 6.5 12/26/2024 02:51 PM    HGB 13.6 12/26/2024 02:51 PM    HCT 40.6 12/26/2024 02:51 PM    .0 12/26/2024 02:51 PM    MCV 89.4 12/26/2024 02:51 PM     Lab Results   Component Value Date/Time     12/26/2024 02:51 PM    K 4.4 12/26/2024 02:51 PM     12/26/2024 02:51 PM    CO2 28.0 12/26/2024 02:51 PM    BUN 18 12/26/2024 02:51 PM    GLU 92 12/26/2024 02:51 PM    CA 9.3 12/26/2024 02:51 PM     No results found for: \"INR\", \"PT\", \"PTT\"  No components found for: \"TROPI\"  Lab Results   Component Value Date/Time    ALB 4.4 12/26/2024 02:51 PM    TP 7.3 12/26/2024 02:51 PM    ALT 25 12/26/2024 02:51 PM    AST 13 12/26/2024 02:51 PM         Review of Data:   CT chest 4/21/25  Findings:   Mediastinum: Unremarkable thoracic aortic caliber. Cardiac size is normal. No pericardial effusion.   Lymph Nodes: AP window lymphadenopathy measuring 25 x 16 mm, previously 26 x 17 mm. Additional   stable right paratracheal node with subcentimeter short axis as well as calcified right subcarinal   node. Multiple bilateral hilar lymphadenopathy. For example on the right side node measures 22 x 23   mm and on the left side node measures 21 x 11 mm. No axillary lymphadenopathy.   Lungs/Pleura:   * No pleural effusions.   *  Mild bilateral paraseptal emphysema and centrilobular emphysema.   *  Stable  heterogeneously enhancing mass in the left upper lobe measuring 4.7 x 5.7 cm, previously   4.7 x 5.3 cm. There is pleural contact in the left upper hemithorax as noted previously.   *  Semisolid stable nodule right upper lobe with solid component measuring 3 mm and overall nodule   measuring 11 mm (3/47)   *  Cavitary 8 mm nodule in the left upper lobe (3/102) previously solid   *  Multiple additional less than 4 mm nodules are stable (3/93, 103, 186, 45)   *  Key: (S/I) = series number / image number   Chest Wall: Spondylosis.   Limited Upper Abdomen: Calcified cholelithiasis.     =====   Impression:   1.  Stable left upper lobe pulmonary mass with bilateral hilar as well as stable mediastinal   lymphadenopathy. These findings are suspicious for metastatic disease.   2.  Now cavitary stable sized nodule in the left upper lobe, suspicious for metastasis.     PET 5/8/25  FINDINGS:   NECK: Several subcentimeter short axis bilateral cervical lymph nodes are present in the   jugulodigastric, periparotid and posterior triangle stations, with low-grade FDG avidity. Present   right posterior triangle lymph nodes exhibit SUVmax 4.2. Left periparotid lymph nodes exhibit SUVmax   5.6. Left jugulodigastric lymph nodes exhibit SUVmax 5.3. These are nonspecific.     CHEST:  Left upper lobe mass exhibits heterogeneous FDG activity, with SUVmax 17.4. Left hilar lymph   nodes exhibit SUVmax 6.7. Left AP window lymph nodes exhibit SUVmax 6.0. Calcified right subcarinal   and right hilar lymph nodes prior granulomatous exposure. A few tiny pulmonary nodules are present   bilaterally, similar to comparison CT chest. These all fall below the lower size/old for PET/CT   assessment. The cavitary satellite nodule just posterior to the main lesion in the left upper lobe   exhibits SUVmax 1.8. A couple normal-sized right axillary lymph nodes exhibit SUVmax 2.8.   Mediastinal blood pool FDG activity is SUVmax 3.6, average 2.2.      ABDOMEN/PELVIS:  No hypermetabolic abdominopelvic lymphadenopathy or masses. Cholelithiasis.   Scattered vascular calcifications of the abdomen and pelvis. Mildly enlarged and heterogeneous   prostate gland without focal lesion. Prominent fat in both inguinal canals, larger on the right.   Small juxta ampullary duodenal diverticulum. Colonic diverticulosis. Small hyperdense lesion in the   right renal cortex inferolaterally, measuring 8 mm, without increased FDG activity. Other small   hypodensities in the renal cortices are incompletely evaluated.     SKELETON:  No suspicious hypermetabolic skeletal lesions.     =====   IMPRESSION:   1. Heterogeneous FDG activity in the JOVON mass, SUVmax 17.4, highly suspicious for malignancy.   2. Hypermetabolic left hilar and AP window lymphadenopathy.   3. Nonspecific low-grade FDG avid cervical lymph nodes, the highest SUVmax in the left periparotid   and jugulodigastric clarisa stations.   4. No hypermetabolic abdominopelvic lymphadenopathy or masses; incidental findings include   cholelithiasis and colonic diverticulosis.   5. No suspicious hypermetabolic skeletal lesions.     Bronchoscopy 6/4/25  Final Diagnosis:   Lung, left upper lobe nodule, transbronchial cryobiopsy:  -Non-small cell carcinoma with squamous features and extensive necrosis.      Final Diagnosis:   A.  Ion robotic assisted navigational transbronchial needle aspiration, lung, left upper lobe, direct smears and cell block preparation:   -Adequate for evaluation.  -POSITIVE for malignancy.  -See [id number] for further classification.     B.  EBUS guided transbronchial needle aspiration, station 7 lymph node, direct smears and cell block preparation:   -Adequate for evaluation.  -No cytologic evidence of malignancy.  -Polymorphous lymphocytes and anthracotic pigment compatible with sampled lymph node.   -Deeper sections examined.      C.  EBUS guided transbronchial needle aspiration, 4L lymph node, direct  smears and cell block preparation:   -Adequate for evaluation.  -No cytologic evidence of malignancy.  -Polymorphous lymphocytes compatible with sampled lymph node.   -Deeper sections examined.      D.  EBUS guided transbronchial needle aspiration, 10L lymph node, direct smears and cell block preparation:   -Adequate for evaluation.  -No cytologic evidence of malignancy.  -Polymorphous lymphocytes and anthracotic pigment compatible with sampled lymph node.   -Deeper sections examined.      E. Bronchoalveolar lavage fluid, lung, left upper lobe, cytospins and cell block preparation:   -Adequate for evaluation.   -Positive for malignant cells.   -See [id number] for further classification.     Brain MRI   IMPRESSION:   1. NO evidence of intracranial metastasis or pathologic brain parenchymal enhancement.   2. Moderate chronic microvascular ischemic changes in the supratentorial white matter.   3. Solitary punctate RIGHT periventricular occipital microhemorrhage, likely remote.   4. Mild, age-appropriate diffuse cerebral, cerebellar, and brainstem atrophy.   5. Incidental LEFT frontal lobe developmental venous anomaly.     PFT 4/23/25: FEV1 104%, DLCO 115%    Assessment/Plan:     Mr. Verma is a 69 year old male current smoker presenting with a left upper lobe cancer.     Patient was getting lung cancer surveillance scans and CT from 4/21/25 showed an enlarging left upper lobe mass measuring 4.7x5.7cm with stable 3mm right upper lobe, cavitary 8mm left upper lobe nodule, and hilar and mediastinal lymphadenopathy. PET from 5/8/25 showed the left upper lobe mass had a max SUV of 17.4, hypermetabolic left hilar and AP window nodes, and cervical  nodes. Cervical lymph node biopsy was negative for malignancy. Bronchoscopy by Dr. Mora with biopsy of the mass was positive for non small cell carcinoma with squamous features. Station 7, 4L and 10L lymph nodes were negative. Brain MRI negative. Patient was referred by Dr. Calle  to discuss surgery upfront.      Discussed options including left VATS upper lobectomy with adjuvant chemotherapy vs neoadjuvant chemo and immunotherapy followed by surgery. Given his RA, he may not be a candidate for IO. Discussed the risks and benefits of surgery. Patient's PFTs suggest he has adequate pulmonary reserve to tolerate the proposed surgery. Dr. Arias discussed case with Dr. Calle and recommended surgery upfront. Patient expressed understanding and would like to proceed with surgery.     -Encouraged smoking cessation   -Scheduled for left VATS upper lobectomy on 8/4/25 at [facility] with Dr. Arias  -Pre op labs ordered  -Hold Enbrel two weeks prior to surgery (starting holding 7/25). Okay to continue methotrexate.     Ludy Mazariegos PA-C  Thoracic Surgery    I have seen and examined that patient and agree with the above assessment and plan.  I have personally reviewed all the pertinent imaging, discussed the case in thoracic oncology tumor board and with the consulting physician.     Mr. Verma  is a 69 year old male who had a left upper lobe lung nodule found on a CT scan done for lung cancer surveillance.  This 5.7cm lesion was further evaluated by PET scan which showed it to have an SUV of 17.4 with some hilar and mediastinal adenopathy.  These findings were concerning for lung cancer and thus a biopsy was done.  The biopsy proved non-small cell lung cancer. I described for Mr. Verma a minimally invasive, VATS lobectomy as treatment.  His pulmonary function tests suggest that he has adequate lung function to undergo the proposed surgery.  I went over the alternatives (neoadjuvant therapy followed by surgery) and the risks, including: bleeding, infection, prolonged air leak, nerve injury, MI, stroke, arrhythmia, pneumonia and death. He understands these risks and wishes to proceed with surgery up front. I discussed this with his oncologist who felt this was appropriate given that Mr. Verma  cannot get immunotherapy given his autoimmune history.  We will plan on surgery August 4th at [facility]. I have encouraged him to quit smoking in the meantime as this will help lessen his incidence of pneumonia and prolonged air leak     Jesse Arias MD  Thoracic Surgery  Pager [phone]    I spent 60 minutes on this visit which included 5 minutes of smoking cessation education as well as: review of tests, independently interpreting results, obtaining history, counseling on additional tests and procedures, communicating with the consulting physician, care coordination and surgery, its risks and alternatives as described in the above assessment and plan.             [1]   Past Medical History:   Asthma (HCC)    Bladder cancer (HCC)    Bleeding hemorrhoid    HTN (hypertension)    Obesity, unspecified    YASIR (obstructive sleep apnea)    AHI 23 REM 45, AutoPAP 4-20    Renal stone    Rheumatoid arthritis with negative rheumatoid factor (HCC)   [2]   Past Surgical History:  Procedure Laterality Date    Colonoscopy  2008    normal    Colonoscopy,biopsy N/A 7/22/2016    Procedure: COLONOSCOPY, POSSIBLE BIOPSY, POSSIBLE POLYPECTOMY 56110;  Surgeon: Peewee Gould MD;  [facility]    Other surgical history  12/28/2020    TURBT - Dr. Johnson    Other surgical history  04/26/2021    Cysto - Dr. Johnson    Other surgical history  01/27/2022    Cystoscopy Dr. Johnson   [3]   Family History  Problem Relation Age of Onset    Hypertension Brother     Hypertension Mother     Other (Other) Mother         accidental    Diabetes Other     Heart Disorder Other     Cancer Neg    [4]   Allergies  Allergen Reactions    Pcn [Penicillins] SWELLING     Throat swells up   [5]   Current Outpatient Medications:     METHOTREXATE 2.5 MG Oral Tab, TAKE 6 TABLETS BY MOUTH ONCE A WEEK, Disp: 78 tablet, Rfl: 0    Etanercept (ENBREL SURECLICK) 50 MG/ML Subcutaneous Solution Auto-injector, INJECT 1 ML SUBCUTANEOUSLY ONCE  A WEEK, Disp: 4 mL, Rfl: 11    triamcinolone 0.1 % External Cream, Apply 1 Application topically 2 (two) times daily., Disp: 45 g, Rfl: 3    FOLIC ACID 1 MG Oral Tab, Take 1 tablet by mouth once daily, Disp: 90 tablet, Rfl: 3    LEFLUNOMIDE 10 MG Oral Tab, Take 1 tablet by mouth once daily, Disp: 90 tablet, Rfl: 1    diazePAM 5 MG Oral Tab, Take 1 tablet (5 mg total) by mouth. (Patient not taking: Reported on 12/7/2023), Disp: , Rfl:     Turmeric (QC TUMERIC COMPLEX OR), Take 1 tablet by mouth daily., Disp: , Rfl:     amLODIPine 5 MG Oral Tab, Take 1 tablet (5 mg total) by mouth in the morning., Disp: , Rfl:     losartan 100 MG Oral Tab, Take 1 tablet (100 mg total) by mouth in the morning., Disp: , Rfl:     Specialty Vitamins Products (PROSTATE OR), Take by mouth in the morning., Disp: , Rfl:   [6]   No current facility-administered medications on file as of 7/9/2025.

## 2025-07-22 RX ORDER — CALCIUM POLYCARBOPHIL 625 MG
TABLET ORAL
COMMUNITY

## 2025-07-25 ENCOUNTER — EKG ENCOUNTER (OUTPATIENT)
Dept: LAB | Age: 70
End: 2025-07-25
Attending: STUDENT IN AN ORGANIZED HEALTH CARE EDUCATION/TRAINING PROGRAM
Payer: MEDICARE

## 2025-07-25 DIAGNOSIS — C34.12 CANCER OF UPPER LOBE OF LEFT LUNG (HCC): ICD-10-CM

## 2025-07-25 LAB
ANION GAP SERPL CALC-SCNC: 5 MMOL/L (ref 0–18)
ANTIBODY SCREEN: NEGATIVE
ATRIAL RATE: 64 BPM
BUN BLD-MCNC: 16 MG/DL (ref 9–23)
BUN/CREAT SERPL: 15.1 (ref 10–20)
CALCIUM BLD-MCNC: 8.9 MG/DL (ref 8.7–10.4)
CHLORIDE SERPL-SCNC: 107 MMOL/L (ref 98–112)
CO2 SERPL-SCNC: 27 MMOL/L (ref 21–32)
CREAT BLD-MCNC: 1.06 MG/DL (ref 0.7–1.3)
DEPRECATED RDW RBC AUTO: 53.1 FL (ref 35.1–46.3)
EGFRCR SERPLBLD CKD-EPI 2021: 75 ML/MIN/1.73M2 (ref 60–?)
ERYTHROCYTE [DISTWIDTH] IN BLOOD BY AUTOMATED COUNT: 16.2 % (ref 11–15)
FASTING STATUS PATIENT QL REPORTED: YES
GLUCOSE BLD-MCNC: 101 MG/DL (ref 70–99)
HCT VFR BLD AUTO: 39 % (ref 39–53)
HGB BLD-MCNC: 12.7 G/DL (ref 13–17.5)
MCH RBC QN AUTO: 29.3 PG (ref 26–34)
MCHC RBC AUTO-ENTMCNC: 32.6 G/DL (ref 31–37)
MCV RBC AUTO: 90.1 FL (ref 80–100)
OSMOLALITY SERPL CALC.SUM OF ELEC: 289 MOSM/KG (ref 275–295)
P AXIS: 62 DEGREES
P-R INTERVAL: 186 MS
PLATELET # BLD AUTO: 282 10(3)UL (ref 150–450)
POTASSIUM SERPL-SCNC: 4.2 MMOL/L (ref 3.5–5.1)
Q-T INTERVAL: 432 MS
QRS DURATION: 152 MS
QTC CALCULATION (BEZET): 445 MS
R AXIS: -16 DEGREES
RBC # BLD AUTO: 4.33 X10(6)UL (ref 3.8–5.8)
RH BLOOD TYPE: POSITIVE
SODIUM SERPL-SCNC: 139 MMOL/L (ref 136–145)
T AXIS: 20 DEGREES
VENTRICULAR RATE: 64 BPM
WBC # BLD AUTO: 6.1 X10(3) UL (ref 4–11)

## 2025-07-25 PROCEDURE — 86901 BLOOD TYPING SEROLOGIC RH(D): CPT

## 2025-07-25 PROCEDURE — 85027 COMPLETE CBC AUTOMATED: CPT | Performed by: STUDENT IN AN ORGANIZED HEALTH CARE EDUCATION/TRAINING PROGRAM

## 2025-07-25 PROCEDURE — 36415 COLL VENOUS BLD VENIPUNCTURE: CPT | Performed by: STUDENT IN AN ORGANIZED HEALTH CARE EDUCATION/TRAINING PROGRAM

## 2025-07-25 PROCEDURE — 86850 RBC ANTIBODY SCREEN: CPT

## 2025-07-25 PROCEDURE — 86900 BLOOD TYPING SEROLOGIC ABO: CPT

## 2025-07-25 PROCEDURE — 93010 ELECTROCARDIOGRAM REPORT: CPT | Performed by: INTERNAL MEDICINE

## 2025-07-25 PROCEDURE — 80048 BASIC METABOLIC PNL TOTAL CA: CPT | Performed by: STUDENT IN AN ORGANIZED HEALTH CARE EDUCATION/TRAINING PROGRAM

## 2025-07-25 PROCEDURE — 93005 ELECTROCARDIOGRAM TRACING: CPT

## 2025-08-04 ENCOUNTER — ANESTHESIA (OUTPATIENT)
Dept: CARDIAC SURGERY | Facility: HOSPITAL | Age: 70
End: 2025-08-04

## 2025-08-04 ENCOUNTER — ANESTHESIA EVENT (OUTPATIENT)
Dept: CARDIAC SURGERY | Facility: HOSPITAL | Age: 70
End: 2025-08-04

## 2025-08-04 ENCOUNTER — HOSPITAL ENCOUNTER (INPATIENT)
Facility: HOSPITAL | Age: 70
LOS: 4 days | Discharge: HOME HEALTH | End: 2025-08-08
Attending: THORACIC SURGERY (CARDIOTHORACIC VASCULAR SURGERY) | Admitting: THORACIC SURGERY (CARDIOTHORACIC VASCULAR SURGERY)

## 2025-08-04 DIAGNOSIS — C34.12 CANCER OF UPPER LOBE OF LEFT LUNG (HCC): Primary | ICD-10-CM

## 2025-08-04 LAB
BASE EXCESS BLD CALC-SCNC: -4 MMOL/L
CA-I BLD-SCNC: 1.13 MMOL/L (ref 1.12–1.32)
CO2 BLD-SCNC: 23 MMOL/L (ref 22–32)
GLUCOSE BLD-MCNC: 89 MG/DL (ref 70–99)
HCO3 BLD-SCNC: 21.7 MEQ/L
HCT VFR BLD CALC: 31 % (ref 37–53)
PCO2 BLD: 38.4 MMHG
PH BLD: 7.36
PO2 BLD: 182 MMHG
POTASSIUM BLD-SCNC: 3.9 MMOL/L (ref 3.6–5.1)
RH BLOOD TYPE: POSITIVE
SAO2 % BLD: 100 %
SODIUM BLD-SCNC: 141 MMOL/L (ref 136–145)

## 2025-08-04 PROCEDURE — 0BTG4ZZ RESECTION OF LEFT UPPER LUNG LOBE, PERCUTANEOUS ENDOSCOPIC APPROACH: ICD-10-PCS | Performed by: THORACIC SURGERY (CARDIOTHORACIC VASCULAR SURGERY)

## 2025-08-04 PROCEDURE — 3E0T3BZ INTRODUCTION OF ANESTHETIC AGENT INTO PERIPHERAL NERVES AND PLEXI, PERCUTANEOUS APPROACH: ICD-10-PCS | Performed by: THORACIC SURGERY (CARDIOTHORACIC VASCULAR SURGERY)

## 2025-08-04 PROCEDURE — 0BBP4ZX EXCISION OF LEFT PLEURA, PERCUTANEOUS ENDOSCOPIC APPROACH, DIAGNOSTIC: ICD-10-PCS | Performed by: THORACIC SURGERY (CARDIOTHORACIC VASCULAR SURGERY)

## 2025-08-04 PROCEDURE — 94660 CPAP INITIATION&MGMT: CPT

## 2025-08-04 PROCEDURE — 88341 IMHCHEM/IMCYTCHM EA ADD ANTB: CPT | Performed by: THORACIC SURGERY (CARDIOTHORACIC VASCULAR SURGERY)

## 2025-08-04 PROCEDURE — 81235 EGFR GENE COM VARIANTS: CPT | Performed by: THORACIC SURGERY (CARDIOTHORACIC VASCULAR SURGERY)

## 2025-08-04 PROCEDURE — 81210 BRAF GENE: CPT | Performed by: THORACIC SURGERY (CARDIOTHORACIC VASCULAR SURGERY)

## 2025-08-04 PROCEDURE — 88381 MICRODISSECTION MANUAL: CPT | Performed by: THORACIC SURGERY (CARDIOTHORACIC VASCULAR SURGERY)

## 2025-08-04 PROCEDURE — 94640 AIRWAY INHALATION TREATMENT: CPT

## 2025-08-04 PROCEDURE — 88360 TUMOR IMMUNOHISTOCHEM/MANUAL: CPT | Performed by: THORACIC SURGERY (CARDIOTHORACIC VASCULAR SURGERY)

## 2025-08-04 PROCEDURE — 84132 ASSAY OF SERUM POTASSIUM: CPT

## 2025-08-04 PROCEDURE — 88309 TISSUE EXAM BY PATHOLOGIST: CPT | Performed by: THORACIC SURGERY (CARDIOTHORACIC VASCULAR SURGERY)

## 2025-08-04 PROCEDURE — 88342 IMHCHEM/IMCYTCHM 1ST ANTB: CPT | Performed by: THORACIC SURGERY (CARDIOTHORACIC VASCULAR SURGERY)

## 2025-08-04 PROCEDURE — 82330 ASSAY OF CALCIUM: CPT

## 2025-08-04 PROCEDURE — 87641 MR-STAPH DNA AMP PROBE: CPT | Performed by: INTERNAL MEDICINE

## 2025-08-04 PROCEDURE — 85014 HEMATOCRIT: CPT

## 2025-08-04 PROCEDURE — 0B988ZZ DRAINAGE OF LEFT UPPER LOBE BRONCHUS, VIA NATURAL OR ARTIFICIAL OPENING ENDOSCOPIC: ICD-10-PCS | Performed by: THORACIC SURGERY (CARDIOTHORACIC VASCULAR SURGERY)

## 2025-08-04 PROCEDURE — 0B918ZZ DRAINAGE OF TRACHEA, VIA NATURAL OR ARTIFICIAL OPENING ENDOSCOPIC: ICD-10-PCS | Performed by: THORACIC SURGERY (CARDIOTHORACIC VASCULAR SURGERY)

## 2025-08-04 PROCEDURE — 07T74ZZ RESECTION OF THORAX LYMPHATIC, PERCUTANEOUS ENDOSCOPIC APPROACH: ICD-10-PCS | Performed by: THORACIC SURGERY (CARDIOTHORACIC VASCULAR SURGERY)

## 2025-08-04 PROCEDURE — 84295 ASSAY OF SERUM SODIUM: CPT

## 2025-08-04 PROCEDURE — 5A09357 ASSISTANCE WITH RESPIRATORY VENTILATION, LESS THAN 24 CONSECUTIVE HOURS, CONTINUOUS POSITIVE AIRWAY PRESSURE: ICD-10-PCS | Performed by: THORACIC SURGERY (CARDIOTHORACIC VASCULAR SURGERY)

## 2025-08-04 PROCEDURE — 88271 CYTOGENETICS DNA PROBE: CPT | Performed by: THORACIC SURGERY (CARDIOTHORACIC VASCULAR SURGERY)

## 2025-08-04 PROCEDURE — 88332 PATH CONSLTJ SURG EA ADD BLK: CPT | Performed by: THORACIC SURGERY (CARDIOTHORACIC VASCULAR SURGERY)

## 2025-08-04 PROCEDURE — 82803 BLOOD GASES ANY COMBINATION: CPT

## 2025-08-04 PROCEDURE — 88274 CYTOGENETICS 25-99: CPT | Performed by: THORACIC SURGERY (CARDIOTHORACIC VASCULAR SURGERY)

## 2025-08-04 PROCEDURE — 88331 PATH CONSLTJ SURG 1 BLK 1SPC: CPT | Performed by: THORACIC SURGERY (CARDIOTHORACIC VASCULAR SURGERY)

## 2025-08-04 PROCEDURE — 88305 TISSUE EXAM BY PATHOLOGIST: CPT | Performed by: THORACIC SURGERY (CARDIOTHORACIC VASCULAR SURGERY)

## 2025-08-04 DEVICE — IMPLANTABLE DEVICE: Type: IMPLANTABLE DEVICE | Status: FUNCTIONAL

## 2025-08-04 RX ORDER — SENNOSIDES 8.6 MG
17.2 TABLET ORAL NIGHTLY PRN
Status: DISCONTINUED | OUTPATIENT
Start: 2025-08-04 | End: 2025-08-08

## 2025-08-04 RX ORDER — OXYCODONE HYDROCHLORIDE 5 MG/1
5 TABLET ORAL EVERY 4 HOURS PRN
Status: DISCONTINUED | OUTPATIENT
Start: 2025-08-04 | End: 2025-08-08

## 2025-08-04 RX ORDER — HYDROMORPHONE HYDROCHLORIDE 1 MG/ML
0.6 INJECTION, SOLUTION INTRAMUSCULAR; INTRAVENOUS; SUBCUTANEOUS EVERY 5 MIN PRN
Status: DISPENSED | OUTPATIENT
Start: 2025-08-04 | End: 2025-08-05

## 2025-08-04 RX ORDER — GLYCOPYRROLATE 0.2 MG/ML
INJECTION, SOLUTION INTRAMUSCULAR; INTRAVENOUS AS NEEDED
Status: DISCONTINUED | OUTPATIENT
Start: 2025-08-04 | End: 2025-08-04 | Stop reason: SURG

## 2025-08-04 RX ORDER — MEPERIDINE HYDROCHLORIDE 25 MG/ML
12.5 INJECTION INTRAMUSCULAR; INTRAVENOUS; SUBCUTANEOUS AS NEEDED
Status: DISCONTINUED | OUTPATIENT
Start: 2025-08-04 | End: 2025-08-08

## 2025-08-04 RX ORDER — ONDANSETRON 2 MG/ML
INJECTION INTRAMUSCULAR; INTRAVENOUS AS NEEDED
Status: DISCONTINUED | OUTPATIENT
Start: 2025-08-04 | End: 2025-08-04 | Stop reason: SURG

## 2025-08-04 RX ORDER — POLYETHYLENE GLYCOL 3350 17 G/17G
17 POWDER, FOR SOLUTION ORAL DAILY PRN
Status: DISCONTINUED | OUTPATIENT
Start: 2025-08-04 | End: 2025-08-08

## 2025-08-04 RX ORDER — IPRATROPIUM BROMIDE AND ALBUTEROL SULFATE 2.5; .5 MG/3ML; MG/3ML
3 SOLUTION RESPIRATORY (INHALATION)
Status: DISCONTINUED | OUTPATIENT
Start: 2025-08-04 | End: 2025-08-05

## 2025-08-04 RX ORDER — NALOXONE HYDROCHLORIDE 0.4 MG/ML
0.08 INJECTION, SOLUTION INTRAMUSCULAR; INTRAVENOUS; SUBCUTANEOUS AS NEEDED
Status: ACTIVE | OUTPATIENT
Start: 2025-08-04 | End: 2025-08-05

## 2025-08-04 RX ORDER — ONDANSETRON 2 MG/ML
4 INJECTION INTRAMUSCULAR; INTRAVENOUS EVERY 6 HOURS PRN
Status: DISCONTINUED | OUTPATIENT
Start: 2025-08-04 | End: 2025-08-08

## 2025-08-04 RX ORDER — HYDROMORPHONE HYDROCHLORIDE 1 MG/ML
0.2 INJECTION, SOLUTION INTRAMUSCULAR; INTRAVENOUS; SUBCUTANEOUS EVERY 5 MIN PRN
Status: ACTIVE | OUTPATIENT
Start: 2025-08-04 | End: 2025-08-05

## 2025-08-04 RX ORDER — HYDROMORPHONE HYDROCHLORIDE 1 MG/ML
0.4 INJECTION, SOLUTION INTRAMUSCULAR; INTRAVENOUS; SUBCUTANEOUS EVERY 5 MIN PRN
Status: DISPENSED | OUTPATIENT
Start: 2025-08-04 | End: 2025-08-05

## 2025-08-04 RX ORDER — SODIUM CHLORIDE, SODIUM LACTATE, POTASSIUM CHLORIDE, CALCIUM CHLORIDE 600; 310; 30; 20 MG/100ML; MG/100ML; MG/100ML; MG/100ML
INJECTION, SOLUTION INTRAVENOUS CONTINUOUS
Status: DISCONTINUED | OUTPATIENT
Start: 2025-08-04 | End: 2025-08-05

## 2025-08-04 RX ORDER — CALCIUM CARBONATE 500 MG/1
1000 TABLET, CHEWABLE ORAL 3 TIMES DAILY PRN
Status: DISCONTINUED | OUTPATIENT
Start: 2025-08-04 | End: 2025-08-08

## 2025-08-04 RX ORDER — BISACODYL 10 MG
10 SUPPOSITORY, RECTAL RECTAL
Status: DISCONTINUED | OUTPATIENT
Start: 2025-08-04 | End: 2025-08-08

## 2025-08-04 RX ORDER — MIDAZOLAM HYDROCHLORIDE 1 MG/ML
INJECTION INTRAMUSCULAR; INTRAVENOUS AS NEEDED
Status: DISCONTINUED | OUTPATIENT
Start: 2025-08-04 | End: 2025-08-04 | Stop reason: SURG

## 2025-08-04 RX ORDER — ACETAMINOPHEN 10 MG/ML
INJECTION, SOLUTION INTRAVENOUS AS NEEDED
Status: DISCONTINUED | OUTPATIENT
Start: 2025-08-04 | End: 2025-08-04 | Stop reason: SURG

## 2025-08-04 RX ORDER — ROCURONIUM BROMIDE 10 MG/ML
INJECTION, SOLUTION INTRAVENOUS AS NEEDED
Status: DISCONTINUED | OUTPATIENT
Start: 2025-08-04 | End: 2025-08-04 | Stop reason: SURG

## 2025-08-04 RX ORDER — BUPIVACAINE HYDROCHLORIDE AND EPINEPHRINE 2.5; 5 MG/ML; UG/ML
INJECTION, SOLUTION EPIDURAL; INFILTRATION; INTRACAUDAL; PERINEURAL AS NEEDED
Status: DISCONTINUED | OUTPATIENT
Start: 2025-08-04 | End: 2025-08-04 | Stop reason: HOSPADM

## 2025-08-04 RX ORDER — AMLODIPINE BESYLATE 5 MG/1
5 TABLET ORAL NIGHTLY
Status: DISCONTINUED | OUTPATIENT
Start: 2025-08-05 | End: 2025-08-08

## 2025-08-04 RX ORDER — HEPARIN SODIUM 5000 [USP'U]/ML
5000 INJECTION, SOLUTION INTRAVENOUS; SUBCUTANEOUS EVERY 8 HOURS SCHEDULED
Status: DISCONTINUED | OUTPATIENT
Start: 2025-08-05 | End: 2025-08-08

## 2025-08-04 RX ORDER — METOCLOPRAMIDE HYDROCHLORIDE 5 MG/ML
10 INJECTION INTRAMUSCULAR; INTRAVENOUS EVERY 8 HOURS PRN
Status: DISCONTINUED | OUTPATIENT
Start: 2025-08-04 | End: 2025-08-08

## 2025-08-04 RX ORDER — SODIUM PHOSPHATE, DIBASIC AND SODIUM PHOSPHATE, MONOBASIC 7; 19 G/230ML; G/230ML
1 ENEMA RECTAL ONCE AS NEEDED
Status: DISCONTINUED | OUTPATIENT
Start: 2025-08-04 | End: 2025-08-08

## 2025-08-04 RX ORDER — KETOROLAC TROMETHAMINE 30 MG/ML
INJECTION, SOLUTION INTRAMUSCULAR; INTRAVENOUS AS NEEDED
Status: DISCONTINUED | OUTPATIENT
Start: 2025-08-04 | End: 2025-08-04 | Stop reason: SURG

## 2025-08-04 RX ORDER — ACETAMINOPHEN 10 MG/ML
1000 INJECTION, SOLUTION INTRAVENOUS EVERY 6 HOURS
Status: DISPENSED | OUTPATIENT
Start: 2025-08-04 | End: 2025-08-06

## 2025-08-04 RX ORDER — DIPHENHYDRAMINE HYDROCHLORIDE 50 MG/ML
12.5 INJECTION, SOLUTION INTRAMUSCULAR; INTRAVENOUS AS NEEDED
Status: ACTIVE | OUTPATIENT
Start: 2025-08-04 | End: 2025-08-05

## 2025-08-04 RX ORDER — HYDROMORPHONE HYDROCHLORIDE 1 MG/ML
0.4 INJECTION, SOLUTION INTRAMUSCULAR; INTRAVENOUS; SUBCUTANEOUS EVERY 2 HOUR PRN
Status: DISCONTINUED | OUTPATIENT
Start: 2025-08-04 | End: 2025-08-08

## 2025-08-04 RX ORDER — NICOTINE 21 MG/24HR
1 PATCH, TRANSDERMAL 24 HOURS TRANSDERMAL DAILY PRN
Status: DISCONTINUED | OUTPATIENT
Start: 2025-08-04 | End: 2025-08-08

## 2025-08-04 RX ORDER — SODIUM CHLORIDE 9 MG/ML
INJECTION, SOLUTION INTRAVENOUS CONTINUOUS
Status: DISCONTINUED | OUTPATIENT
Start: 2025-08-04 | End: 2025-08-05

## 2025-08-04 RX ORDER — SODIUM CHLORIDE, SODIUM LACTATE, POTASSIUM CHLORIDE, CALCIUM CHLORIDE 600; 310; 30; 20 MG/100ML; MG/100ML; MG/100ML; MG/100ML
INJECTION, SOLUTION INTRAVENOUS CONTINUOUS PRN
Status: DISCONTINUED | OUTPATIENT
Start: 2025-08-04 | End: 2025-08-04 | Stop reason: SURG

## 2025-08-04 RX ORDER — OXYCODONE HYDROCHLORIDE 5 MG/1
10 TABLET ORAL EVERY 4 HOURS PRN
Status: DISCONTINUED | OUTPATIENT
Start: 2025-08-04 | End: 2025-08-08

## 2025-08-04 RX ORDER — LOSARTAN POTASSIUM 100 MG/1
100 TABLET ORAL NIGHTLY
Status: DISCONTINUED | OUTPATIENT
Start: 2025-08-05 | End: 2025-08-08

## 2025-08-04 RX ORDER — DEXAMETHASONE SODIUM PHOSPHATE 4 MG/ML
VIAL (ML) INJECTION AS NEEDED
Status: DISCONTINUED | OUTPATIENT
Start: 2025-08-04 | End: 2025-08-04 | Stop reason: SURG

## 2025-08-04 RX ORDER — MIDAZOLAM HYDROCHLORIDE 1 MG/ML
1 INJECTION INTRAMUSCULAR; INTRAVENOUS EVERY 5 MIN PRN
Status: ACTIVE | OUTPATIENT
Start: 2025-08-04 | End: 2025-08-05

## 2025-08-04 RX ORDER — CEFAZOLIN SODIUM IN 0.9 % NACL 3 G/100 ML
3 INTRAVENOUS SOLUTION, PIGGYBACK (ML) INTRAVENOUS EVERY 8 HOURS
Status: COMPLETED | OUTPATIENT
Start: 2025-08-04 | End: 2025-08-05

## 2025-08-04 RX ORDER — HYDROMORPHONE HYDROCHLORIDE 1 MG/ML
0.8 INJECTION, SOLUTION INTRAMUSCULAR; INTRAVENOUS; SUBCUTANEOUS EVERY 2 HOUR PRN
Status: DISCONTINUED | OUTPATIENT
Start: 2025-08-04 | End: 2025-08-08

## 2025-08-04 RX ORDER — SODIUM CHLORIDE 9 MG/ML
INJECTION, SOLUTION INTRAVENOUS CONTINUOUS PRN
Status: DISCONTINUED | OUTPATIENT
Start: 2025-08-04 | End: 2025-08-04 | Stop reason: SURG

## 2025-08-04 RX ADMIN — ROCURONIUM BROMIDE 10 MG: 10 INJECTION, SOLUTION INTRAVENOUS at 15:18:00

## 2025-08-04 RX ADMIN — ONDANSETRON 4 MG: 2 INJECTION INTRAMUSCULAR; INTRAVENOUS at 15:35:00

## 2025-08-04 RX ADMIN — ACETAMINOPHEN 1000 MG: 10 INJECTION, SOLUTION INTRAVENOUS at 15:35:00

## 2025-08-04 RX ADMIN — SODIUM CHLORIDE, SODIUM LACTATE, POTASSIUM CHLORIDE, CALCIUM CHLORIDE: 600; 310; 30; 20 INJECTION, SOLUTION INTRAVENOUS at 11:52:00

## 2025-08-04 RX ADMIN — ROCURONIUM BROMIDE 30 MG: 10 INJECTION, SOLUTION INTRAVENOUS at 13:54:00

## 2025-08-04 RX ADMIN — SODIUM CHLORIDE: 9 INJECTION, SOLUTION INTRAVENOUS at 16:00:00

## 2025-08-04 RX ADMIN — MIDAZOLAM HYDROCHLORIDE 2 MG: 1 INJECTION INTRAMUSCULAR; INTRAVENOUS at 11:55:00

## 2025-08-04 RX ADMIN — SODIUM CHLORIDE: 9 INJECTION, SOLUTION INTRAVENOUS at 13:25:00

## 2025-08-04 RX ADMIN — ROCURONIUM BROMIDE 70 MG: 10 INJECTION, SOLUTION INTRAVENOUS at 12:01:00

## 2025-08-04 RX ADMIN — SODIUM CHLORIDE, SODIUM LACTATE, POTASSIUM CHLORIDE, CALCIUM CHLORIDE: 600; 310; 30; 20 INJECTION, SOLUTION INTRAVENOUS at 16:02:00

## 2025-08-04 RX ADMIN — DEXAMETHASONE SODIUM PHOSPHATE 8 MG: 4 MG/ML VIAL (ML) INJECTION at 12:59:00

## 2025-08-04 RX ADMIN — ROCURONIUM BROMIDE 10 MG: 10 INJECTION, SOLUTION INTRAVENOUS at 15:22:00

## 2025-08-04 RX ADMIN — SODIUM CHLORIDE, SODIUM LACTATE, POTASSIUM CHLORIDE, CALCIUM CHLORIDE: 600; 310; 30; 20 INJECTION, SOLUTION INTRAVENOUS at 13:25:00

## 2025-08-04 RX ADMIN — GLYCOPYRROLATE 0.3 MG: 0.2 INJECTION, SOLUTION INTRAMUSCULAR; INTRAVENOUS at 12:16:00

## 2025-08-04 RX ADMIN — KETOROLAC TROMETHAMINE 30 MG: 30 INJECTION, SOLUTION INTRAMUSCULAR; INTRAVENOUS at 16:00:00

## 2025-08-04 RX ADMIN — SODIUM CHLORIDE: 9 INJECTION, SOLUTION INTRAVENOUS at 11:53:00

## 2025-08-05 LAB
ANION GAP SERPL CALC-SCNC: 7 MMOL/L (ref 0–18)
BASOPHILS # BLD AUTO: 0.01 X10(3) UL (ref 0–0.2)
BASOPHILS NFR BLD AUTO: 0.1 %
BUN BLD-MCNC: 18 MG/DL (ref 9–23)
CALCIUM BLD-MCNC: 7.9 MG/DL (ref 8.7–10.6)
CHLORIDE SERPL-SCNC: 109 MMOL/L (ref 98–112)
CO2 SERPL-SCNC: 26 MMOL/L (ref 21–32)
CREAT BLD-MCNC: 0.97 MG/DL (ref 0.7–1.3)
EGFRCR SERPLBLD CKD-EPI 2021: 83 ML/MIN/1.73M2 (ref 60–?)
EOSINOPHIL # BLD AUTO: 0 X10(3) UL (ref 0–0.7)
EOSINOPHIL NFR BLD AUTO: 0 %
ERYTHROCYTE [DISTWIDTH] IN BLOOD BY AUTOMATED COUNT: 16.6 %
GLUCOSE BLD-MCNC: 153 MG/DL (ref 70–99)
HCT VFR BLD AUTO: 34.2 % (ref 39–53)
HGB BLD-MCNC: 11.6 G/DL (ref 13–17.5)
IMM GRANULOCYTES # BLD AUTO: 0.02 X10(3) UL (ref 0–1)
IMM GRANULOCYTES NFR BLD: 0.2 %
LYMPHOCYTES # BLD AUTO: 0.95 X10(3) UL (ref 1–4)
LYMPHOCYTES NFR BLD AUTO: 10.1 %
MAGNESIUM SERPL-MCNC: 1.9 MG/DL (ref 1.6–2.6)
MCH RBC QN AUTO: 29.4 PG (ref 26–34)
MCHC RBC AUTO-ENTMCNC: 33.9 G/DL (ref 31–37)
MCV RBC AUTO: 86.8 FL (ref 80–100)
MONOCYTES # BLD AUTO: 0.65 X10(3) UL (ref 0.1–1)
MONOCYTES NFR BLD AUTO: 6.9 %
MRSA DNA SPEC QL NAA+PROBE: POSITIVE
NEUTROPHILS # BLD AUTO: 7.74 X10 (3) UL (ref 1.5–7.7)
NEUTROPHILS # BLD AUTO: 7.74 X10(3) UL (ref 1.5–7.7)
NEUTROPHILS NFR BLD AUTO: 82.7 %
OSMOLALITY SERPL CALC.SUM OF ELEC: 299 MOSM/KG (ref 275–295)
PLATELET # BLD AUTO: 249 10(3)UL (ref 150–450)
POTASSIUM SERPL-SCNC: 4.6 MMOL/L (ref 3.5–5.1)
RBC # BLD AUTO: 3.94 X10(6)UL (ref 3.8–5.8)
SODIUM SERPL-SCNC: 142 MMOL/L (ref 136–145)
WBC # BLD AUTO: 9.4 X10(3) UL (ref 4–11)

## 2025-08-05 PROCEDURE — 80048 BASIC METABOLIC PNL TOTAL CA: CPT | Performed by: INTERNAL MEDICINE

## 2025-08-05 PROCEDURE — 94640 AIRWAY INHALATION TREATMENT: CPT

## 2025-08-05 PROCEDURE — 85025 COMPLETE CBC W/AUTO DIFF WBC: CPT | Performed by: INTERNAL MEDICINE

## 2025-08-05 PROCEDURE — 94664 DEMO&/EVAL PT USE INHALER: CPT

## 2025-08-05 PROCEDURE — 94799 UNLISTED PULMONARY SVC/PX: CPT

## 2025-08-05 PROCEDURE — 83735 ASSAY OF MAGNESIUM: CPT | Performed by: INTERNAL MEDICINE

## 2025-08-05 RX ORDER — IPRATROPIUM BROMIDE AND ALBUTEROL SULFATE 2.5; .5 MG/3ML; MG/3ML
3 SOLUTION RESPIRATORY (INHALATION)
Status: DISCONTINUED | OUTPATIENT
Start: 2025-08-05 | End: 2025-08-06

## 2025-08-05 RX ORDER — KETOROLAC TROMETHAMINE 15 MG/ML
15 INJECTION, SOLUTION INTRAMUSCULAR; INTRAVENOUS EVERY 6 HOURS
Status: COMPLETED | OUTPATIENT
Start: 2025-08-05 | End: 2025-08-06

## 2025-08-06 LAB
ANION GAP SERPL CALC-SCNC: 8 MMOL/L (ref 0–18)
BASOPHILS # BLD AUTO: 0.02 X10(3) UL (ref 0–0.2)
BASOPHILS NFR BLD AUTO: 0.2 %
BLOOD TYPE BARCODE: 6200
BUN BLD-MCNC: 24 MG/DL (ref 9–23)
CALCIUM BLD-MCNC: 8.2 MG/DL (ref 8.7–10.6)
CHLORIDE SERPL-SCNC: 107 MMOL/L (ref 98–112)
CO2 SERPL-SCNC: 25 MMOL/L (ref 21–32)
CREAT BLD-MCNC: 1.17 MG/DL (ref 0.7–1.3)
EGFRCR SERPLBLD CKD-EPI 2021: 66 ML/MIN/1.73M2 (ref 60–?)
EOSINOPHIL # BLD AUTO: 0.19 X10(3) UL (ref 0–0.7)
EOSINOPHIL NFR BLD AUTO: 2.3 %
ERYTHROCYTE [DISTWIDTH] IN BLOOD BY AUTOMATED COUNT: 16.9 %
GLUCOSE BLD-MCNC: 124 MG/DL (ref 70–99)
HCT VFR BLD AUTO: 32 % (ref 39–53)
HGB BLD-MCNC: 10.7 G/DL (ref 13–17.5)
IMM GRANULOCYTES # BLD AUTO: 0.03 X10(3) UL (ref 0–1)
IMM GRANULOCYTES NFR BLD: 0.4 %
LYMPHOCYTES # BLD AUTO: 1.97 X10(3) UL (ref 1–4)
LYMPHOCYTES NFR BLD AUTO: 23.7 %
MAGNESIUM SERPL-MCNC: 1.9 MG/DL (ref 1.6–2.6)
MCH RBC QN AUTO: 29.5 PG (ref 26–34)
MCHC RBC AUTO-ENTMCNC: 33.4 G/DL (ref 31–37)
MCV RBC AUTO: 88.2 FL (ref 80–100)
MONOCYTES # BLD AUTO: 0.75 X10(3) UL (ref 0.1–1)
MONOCYTES NFR BLD AUTO: 9 %
NEUTROPHILS # BLD AUTO: 5.36 X10 (3) UL (ref 1.5–7.7)
NEUTROPHILS # BLD AUTO: 5.36 X10(3) UL (ref 1.5–7.7)
NEUTROPHILS NFR BLD AUTO: 64.4 %
OSMOLALITY SERPL CALC.SUM OF ELEC: 295 MOSM/KG (ref 275–295)
PLATELET # BLD AUTO: 226 10(3)UL (ref 150–450)
POTASSIUM SERPL-SCNC: 4 MMOL/L (ref 3.5–5.1)
RBC # BLD AUTO: 3.63 X10(6)UL (ref 3.8–5.8)
SODIUM SERPL-SCNC: 140 MMOL/L (ref 136–145)
UNIT VOLUME: 350 ML
WBC # BLD AUTO: 8.3 X10(3) UL (ref 4–11)

## 2025-08-06 PROCEDURE — 80048 BASIC METABOLIC PNL TOTAL CA: CPT | Performed by: INTERNAL MEDICINE

## 2025-08-06 PROCEDURE — 83735 ASSAY OF MAGNESIUM: CPT | Performed by: INTERNAL MEDICINE

## 2025-08-06 PROCEDURE — 85025 COMPLETE CBC W/AUTO DIFF WBC: CPT | Performed by: INTERNAL MEDICINE

## 2025-08-06 PROCEDURE — 97165 OT EVAL LOW COMPLEX 30 MIN: CPT

## 2025-08-06 PROCEDURE — 94664 DEMO&/EVAL PT USE INHALER: CPT

## 2025-08-06 PROCEDURE — 94640 AIRWAY INHALATION TREATMENT: CPT

## 2025-08-06 PROCEDURE — 97530 THERAPEUTIC ACTIVITIES: CPT

## 2025-08-06 PROCEDURE — 97161 PT EVAL LOW COMPLEX 20 MIN: CPT

## 2025-08-06 PROCEDURE — 97116 GAIT TRAINING THERAPY: CPT

## 2025-08-06 RX ORDER — IPRATROPIUM BROMIDE AND ALBUTEROL SULFATE 2.5; .5 MG/3ML; MG/3ML
3 SOLUTION RESPIRATORY (INHALATION) EVERY 6 HOURS PRN
Status: DISCONTINUED | OUTPATIENT
Start: 2025-08-06 | End: 2025-08-08

## 2025-08-07 ENCOUNTER — APPOINTMENT (OUTPATIENT)
Dept: GENERAL RADIOLOGY | Facility: HOSPITAL | Age: 70
End: 2025-08-07
Attending: STUDENT IN AN ORGANIZED HEALTH CARE EDUCATION/TRAINING PROGRAM

## 2025-08-07 LAB
ANION GAP SERPL CALC-SCNC: 9 MMOL/L (ref 0–18)
BASOPHILS # BLD AUTO: 0.03 X10(3) UL (ref 0–0.2)
BASOPHILS NFR BLD AUTO: 0.4 %
BUN BLD-MCNC: 18 MG/DL (ref 9–23)
CALCIUM BLD-MCNC: 8.1 MG/DL (ref 8.7–10.6)
CHLORIDE SERPL-SCNC: 108 MMOL/L (ref 98–112)
CO2 SERPL-SCNC: 23 MMOL/L (ref 21–32)
CREAT BLD-MCNC: 1.06 MG/DL (ref 0.7–1.3)
EGFRCR SERPLBLD CKD-EPI 2021: 75 ML/MIN/1.73M2 (ref 60–?)
EOSINOPHIL # BLD AUTO: 0.28 X10(3) UL (ref 0–0.7)
EOSINOPHIL NFR BLD AUTO: 3.5 %
ERYTHROCYTE [DISTWIDTH] IN BLOOD BY AUTOMATED COUNT: 16.8 %
GLUCOSE BLD-MCNC: 100 MG/DL (ref 70–99)
HCT VFR BLD AUTO: 30.8 % (ref 39–53)
HGB BLD-MCNC: 10.4 G/DL (ref 13–17.5)
IMM GRANULOCYTES # BLD AUTO: 0.04 X10(3) UL (ref 0–1)
IMM GRANULOCYTES NFR BLD: 0.5 %
LYMPHOCYTES # BLD AUTO: 2.12 X10(3) UL (ref 1–4)
LYMPHOCYTES NFR BLD AUTO: 26.4 %
MAGNESIUM SERPL-MCNC: 1.8 MG/DL (ref 1.6–2.6)
MCH RBC QN AUTO: 29.4 PG (ref 26–34)
MCHC RBC AUTO-ENTMCNC: 33.8 G/DL (ref 31–37)
MCV RBC AUTO: 87 FL (ref 80–100)
MONOCYTES # BLD AUTO: 0.6 X10(3) UL (ref 0.1–1)
MONOCYTES NFR BLD AUTO: 7.5 %
NEUTROPHILS # BLD AUTO: 4.96 X10 (3) UL (ref 1.5–7.7)
NEUTROPHILS # BLD AUTO: 4.96 X10(3) UL (ref 1.5–7.7)
NEUTROPHILS NFR BLD AUTO: 61.7 %
OSMOLALITY SERPL CALC.SUM OF ELEC: 292 MOSM/KG (ref 275–295)
PLATELET # BLD AUTO: 233 10(3)UL (ref 150–450)
POTASSIUM SERPL-SCNC: 4.2 MMOL/L (ref 3.5–5.1)
RBC # BLD AUTO: 3.54 X10(6)UL (ref 3.8–5.8)
SODIUM SERPL-SCNC: 140 MMOL/L (ref 136–145)
WBC # BLD AUTO: 8 X10(3) UL (ref 4–11)

## 2025-08-07 PROCEDURE — 85025 COMPLETE CBC W/AUTO DIFF WBC: CPT | Performed by: INTERNAL MEDICINE

## 2025-08-07 PROCEDURE — 94799 UNLISTED PULMONARY SVC/PX: CPT

## 2025-08-07 PROCEDURE — 71045 X-RAY EXAM CHEST 1 VIEW: CPT | Performed by: STUDENT IN AN ORGANIZED HEALTH CARE EDUCATION/TRAINING PROGRAM

## 2025-08-07 PROCEDURE — 80048 BASIC METABOLIC PNL TOTAL CA: CPT | Performed by: INTERNAL MEDICINE

## 2025-08-07 PROCEDURE — 83735 ASSAY OF MAGNESIUM: CPT | Performed by: INTERNAL MEDICINE

## 2025-08-07 RX ORDER — ACETAMINOPHEN 500 MG
500 TABLET ORAL EVERY 6 HOURS PRN
Status: DISCONTINUED | OUTPATIENT
Start: 2025-08-07 | End: 2025-08-08

## 2025-08-07 RX ORDER — ACETAMINOPHEN 500 MG
1000 TABLET ORAL EVERY 6 HOURS PRN
Status: DISCONTINUED | OUTPATIENT
Start: 2025-08-07 | End: 2025-08-08

## 2025-08-07 RX ORDER — MAGNESIUM OXIDE 400 MG/1
400 TABLET ORAL ONCE
Status: COMPLETED | OUTPATIENT
Start: 2025-08-07 | End: 2025-08-07

## 2025-08-08 ENCOUNTER — APPOINTMENT (OUTPATIENT)
Dept: GENERAL RADIOLOGY | Facility: HOSPITAL | Age: 70
End: 2025-08-08
Attending: STUDENT IN AN ORGANIZED HEALTH CARE EDUCATION/TRAINING PROGRAM

## 2025-08-08 VITALS
HEART RATE: 79 BPM | HEIGHT: 74 IN | SYSTOLIC BLOOD PRESSURE: 147 MMHG | TEMPERATURE: 99 F | BODY MASS INDEX: 34.91 KG/M2 | RESPIRATION RATE: 18 BRPM | OXYGEN SATURATION: 100 % | WEIGHT: 272.06 LBS | DIASTOLIC BLOOD PRESSURE: 107 MMHG

## 2025-08-08 LAB
ANION GAP SERPL CALC-SCNC: 7 MMOL/L (ref 0–18)
BASOPHILS # BLD AUTO: 0.02 X10(3) UL (ref 0–0.2)
BASOPHILS NFR BLD AUTO: 0.3 %
BUN BLD-MCNC: 15 MG/DL (ref 9–23)
CALCIUM BLD-MCNC: 8.3 MG/DL (ref 8.7–10.6)
CHLORIDE SERPL-SCNC: 108 MMOL/L (ref 98–112)
CO2 SERPL-SCNC: 25 MMOL/L (ref 21–32)
CREAT BLD-MCNC: 0.97 MG/DL (ref 0.7–1.3)
EGFRCR SERPLBLD CKD-EPI 2021: 83 ML/MIN/1.73M2 (ref 60–?)
EOSINOPHIL # BLD AUTO: 0.37 X10(3) UL (ref 0–0.7)
EOSINOPHIL NFR BLD AUTO: 5.5 %
ERYTHROCYTE [DISTWIDTH] IN BLOOD BY AUTOMATED COUNT: 16.7 %
GLUCOSE BLD-MCNC: 106 MG/DL (ref 70–99)
HCT VFR BLD AUTO: 31.8 % (ref 39–53)
HGB BLD-MCNC: 10.6 G/DL (ref 13–17.5)
IMM GRANULOCYTES # BLD AUTO: 0.04 X10(3) UL (ref 0–1)
IMM GRANULOCYTES NFR BLD: 0.6 %
LYMPHOCYTES # BLD AUTO: 1.8 X10(3) UL (ref 1–4)
LYMPHOCYTES NFR BLD AUTO: 26.9 %
MAGNESIUM SERPL-MCNC: 1.9 MG/DL (ref 1.6–2.6)
MCH RBC QN AUTO: 29.5 PG (ref 26–34)
MCHC RBC AUTO-ENTMCNC: 33.3 G/DL (ref 31–37)
MCV RBC AUTO: 88.6 FL (ref 80–100)
MONOCYTES # BLD AUTO: 0.72 X10(3) UL (ref 0.1–1)
MONOCYTES NFR BLD AUTO: 10.7 %
NEUTROPHILS # BLD AUTO: 3.75 X10 (3) UL (ref 1.5–7.7)
NEUTROPHILS # BLD AUTO: 3.75 X10(3) UL (ref 1.5–7.7)
NEUTROPHILS NFR BLD AUTO: 56 %
OSMOLALITY SERPL CALC.SUM OF ELEC: 291 MOSM/KG (ref 275–295)
PLATELET # BLD AUTO: 242 10(3)UL (ref 150–450)
POTASSIUM SERPL-SCNC: 4.3 MMOL/L (ref 3.5–5.1)
RBC # BLD AUTO: 3.59 X10(6)UL (ref 3.8–5.8)
SODIUM SERPL-SCNC: 140 MMOL/L (ref 136–145)
WBC # BLD AUTO: 6.7 X10(3) UL (ref 4–11)

## 2025-08-08 PROCEDURE — 71045 X-RAY EXAM CHEST 1 VIEW: CPT | Performed by: STUDENT IN AN ORGANIZED HEALTH CARE EDUCATION/TRAINING PROGRAM

## 2025-08-08 PROCEDURE — 83735 ASSAY OF MAGNESIUM: CPT | Performed by: INTERNAL MEDICINE

## 2025-08-08 PROCEDURE — 80048 BASIC METABOLIC PNL TOTAL CA: CPT | Performed by: INTERNAL MEDICINE

## 2025-08-08 PROCEDURE — 85025 COMPLETE CBC W/AUTO DIFF WBC: CPT | Performed by: INTERNAL MEDICINE

## 2025-08-08 RX ORDER — OXYCODONE HYDROCHLORIDE 5 MG/1
5 TABLET ORAL EVERY 4 HOURS PRN
Qty: 30 TABLET | Refills: 0 | Status: SHIPPED | OUTPATIENT
Start: 2025-08-08

## 2025-08-13 ENCOUNTER — OFFICE VISIT (OUTPATIENT)
Dept: SURGERY | Facility: CLINIC | Age: 70
End: 2025-08-13

## 2025-08-13 ENCOUNTER — HOSPITAL ENCOUNTER (OUTPATIENT)
Dept: GENERAL RADIOLOGY | Age: 70
Discharge: HOME | End: 2025-08-13
Attending: STUDENT IN AN ORGANIZED HEALTH CARE EDUCATION/TRAINING PROGRAM

## 2025-08-13 VITALS
HEART RATE: 78 BPM | RESPIRATION RATE: 18 BRPM | DIASTOLIC BLOOD PRESSURE: 78 MMHG | OXYGEN SATURATION: 99 % | SYSTOLIC BLOOD PRESSURE: 110 MMHG

## 2025-08-13 DIAGNOSIS — C34.12 CANCER OF UPPER LOBE OF LEFT LUNG (HCC): ICD-10-CM

## 2025-08-13 DIAGNOSIS — C34.12 SQUAMOUS CELL CARCINOMA OF UPPER LOBE OF LEFT LUNG (HCC): Primary | ICD-10-CM

## 2025-08-13 PROCEDURE — 71046 X-RAY EXAM CHEST 2 VIEWS: CPT | Performed by: STUDENT IN AN ORGANIZED HEALTH CARE EDUCATION/TRAINING PROGRAM

## 2025-08-14 LAB
% TUMOR CELLS STAINING: <1 %
% TUMOR CELLS STAINING: <1 %

## 2025-08-20 ENCOUNTER — OFFICE VISIT (OUTPATIENT)
Dept: SURGERY | Facility: CLINIC | Age: 70
End: 2025-08-20

## 2025-08-20 VITALS
DIASTOLIC BLOOD PRESSURE: 77 MMHG | HEART RATE: 80 BPM | SYSTOLIC BLOOD PRESSURE: 130 MMHG | OXYGEN SATURATION: 100 % | RESPIRATION RATE: 18 BRPM

## 2025-08-20 DIAGNOSIS — C34.12 SQUAMOUS CELL CARCINOMA OF UPPER LOBE OF LEFT LUNG (HCC): Primary | ICD-10-CM

## 2025-08-20 PROCEDURE — 99024 POSTOP FOLLOW-UP VISIT: CPT | Performed by: STUDENT IN AN ORGANIZED HEALTH CARE EDUCATION/TRAINING PROGRAM

## 2025-08-20 RX ORDER — OXYCODONE HYDROCHLORIDE 5 MG/1
5 TABLET ORAL EVERY 4 HOURS PRN
Qty: 30 TABLET | Refills: 0 | Status: SHIPPED | OUTPATIENT
Start: 2025-08-20 | End: 2025-09-19

## (undated) DIAGNOSIS — M05.79 RHEUMATOID ARTHRITIS INVOLVING MULTIPLE SITES WITH POSITIVE RHEUMATOID FACTOR (HCC): ICD-10-CM

## (undated) DIAGNOSIS — Z51.81 THERAPEUTIC DRUG MONITORING: Primary | ICD-10-CM

## (undated) DEVICE — Device

## (undated) DEVICE — 3M™ RED DOT™ MONITORING ELECTRODE WITH FOAM TAPE AND STICKY GEL, 50/BAG, 20/CASE, 72/PLT 2570: Brand: RED DOT™

## (undated) DEVICE — MEDI-VAC NON-CONDUCTIVE SUCTION TUBING: Brand: CARDINAL HEALTH

## (undated) DEVICE — PACK CV CUSTOM

## (undated) DEVICE — 60 ML SYRINGE REGULAR TIP: Brand: MONOJECT

## (undated) DEVICE — SYRINGE MED 10ML LL TIP W/O SFTY DISP

## (undated) DEVICE — SOLUTION ANTIFOG W/ ADH BK FOAM SPNG RADPQ

## (undated) DEVICE — APPLICATOR STD 6IN COT TIP WOOD HNDL ST

## (undated) DEVICE — MEDI-VAC SUCTION HANDLE REGULAR CAPACITY: Brand: CARDINAL HEALTH

## (undated) DEVICE — 4-WAY HIGH FLOW STOPCOCK W/ROTATING LUER: Brand: ICU MEDICAL

## (undated) DEVICE — KIT CUSTOM ENDOPROCEDURE STERIS

## (undated) DEVICE — ZZ-CONVERTED-TO 469792 SPIROMETER 4L MNL BALL INDIC SGL PT

## (undated) DEVICE — DRAIN CHST SGL COLL 1 PT TB FOR ATS BG CMPTBL

## (undated) DEVICE — TRAY CATH 16FR F INCL BARDX IC COMPLT CARE

## (undated) DEVICE — GLOVE SUR 6 PROTEXIS PI PIP CRM PWD F

## (undated) DEVICE — VISION PROBE ADAPTER AND SUCTION ADAPTER

## (undated) DEVICE — SWIVEL CONNECTOR

## (undated) DEVICE — SINGLE USE SUCTION VALVE MAJ-209: Brand: SINGLE USE SUCTION VALVE (STERILE)

## (undated) DEVICE — ADAPTER BRONCHSCP 15MM FBROPT SWVL 2 AXIS

## (undated) DEVICE — AIRLIFE™ MISTY MAX 10™ NEBULIZER WITH 7 FOOT (2.1 M) FEMALE U/CONNECT-IT CRUSH RESISTANT OXYGEN TUBING, BAFFLED TEE ADAPTER (22 MM I.D./ 22 MM O.D.), MOUTHPIECE AND 6 INCH (15 CM) FLEXTUBE: Brand: AIRLIFE™

## (undated) DEVICE — SUT MCRYL 4-0 18IN PS-2 ABSRB UD 19MM 3/8 CIR

## (undated) DEVICE — GLOVE,SURG,SENSICARE,ALOE,LF,PF,7: Brand: MEDLINE

## (undated) DEVICE — SUT VCRL 2-0 36IN CT-1 ABSRB UD L36MM 1/2 CIR

## (undated) DEVICE — KENDALL SCD EXPRESS SLEEVES, KNEE LENGTH, MEDIUM: Brand: KENDALL SCD

## (undated) DEVICE — SLEEVE COMPR MD KNEE LEN SGL USE KENDALL SCD

## (undated) DEVICE — SYRINGE MED 10ML SLIP TIP CLR BRL TAPR PLUNG

## (undated) DEVICE — CAUTERY PENCIL EDGE ROCKER

## (undated) DEVICE — 1200CC GUARDIAN II: Brand: GUARDIAN

## (undated) DEVICE — Device: Brand: BALLOON

## (undated) DEVICE — CATHETER THOR 28FR L23IN CLR PVC HEP STR TAPR

## (undated) DEVICE — SINGLE USE BIOPSY VALVE MAJ-210: Brand: SINGLE USE BIOPSY VALVE (STERILE)

## (undated) DEVICE — SUT PERMA- 0 30IN FSL NABSRB BLK 30MM 3/8

## (undated) DEVICE — ENSEAL SEALER TISS L37 CM CURV

## (undated) DEVICE — NEEDLE ASPIR 22GA X 700MM SYR VLV ECHOGENIC

## (undated) DEVICE — Device: Brand: ERBE

## (undated) DEVICE — BIOPSY NEEDLE, 21G: Brand: FLEXISION

## (undated) DEVICE — GLOVE SUR 7.5 PROTEXIS PI PIP CRM PWD F

## (undated) DEVICE — STAPLER MED SHFT L340MM JAW L45MM STD ECHELON

## (undated) DEVICE — SOLUTION IRRIG 1000ML ST H2O AQUALITE PLAS

## (undated) DEVICE — BOWL MED MD 16OZ PLAS CAP GRAD

## (undated) DEVICE — MAJ-1414 SINGLE USE ADPATER BIOPSY VALV: Brand: SINGLE USE ADAPTOR BIOPSY VALVE

## (undated) NOTE — Clinical Note
I saw your patient, Mr. Verma.  Please see attached note for my assessment and plans moving forward.  Thank you for involving me in his care.  Please feel free to call me with any questions at [phone]  Howie Arias Thoracic Surgery